# Patient Record
Sex: MALE | Race: BLACK OR AFRICAN AMERICAN | Employment: UNEMPLOYED | URBAN - METROPOLITAN AREA
[De-identification: names, ages, dates, MRNs, and addresses within clinical notes are randomized per-mention and may not be internally consistent; named-entity substitution may affect disease eponyms.]

---

## 2023-04-01 ENCOUNTER — APPOINTMENT (OUTPATIENT)
Dept: GENERAL RADIOLOGY | Age: 63
DRG: 176 | End: 2023-04-01
Attending: STUDENT IN AN ORGANIZED HEALTH CARE EDUCATION/TRAINING PROGRAM
Payer: MEDICARE

## 2023-04-01 ENCOUNTER — APPOINTMENT (OUTPATIENT)
Dept: ULTRASOUND IMAGING | Age: 63
DRG: 176 | End: 2023-04-01
Attending: STUDENT IN AN ORGANIZED HEALTH CARE EDUCATION/TRAINING PROGRAM
Payer: MEDICARE

## 2023-04-01 ENCOUNTER — APPOINTMENT (OUTPATIENT)
Dept: CT IMAGING | Age: 63
DRG: 176 | End: 2023-04-01
Attending: STUDENT IN AN ORGANIZED HEALTH CARE EDUCATION/TRAINING PROGRAM
Payer: MEDICARE

## 2023-04-01 ENCOUNTER — HOSPITAL ENCOUNTER (INPATIENT)
Age: 63
LOS: 2 days | Discharge: HOME | DRG: 176 | End: 2023-04-03
Attending: STUDENT IN AN ORGANIZED HEALTH CARE EDUCATION/TRAINING PROGRAM | Admitting: STUDENT IN AN ORGANIZED HEALTH CARE EDUCATION/TRAINING PROGRAM
Payer: MEDICARE

## 2023-04-01 DIAGNOSIS — I26.99 PULMONARY EMBOLISM, BILATERAL (HCC): Primary | ICD-10-CM

## 2023-04-01 DIAGNOSIS — I26.99 OTHER ACUTE PULMONARY EMBOLISM WITHOUT ACUTE COR PULMONALE (HCC): ICD-10-CM

## 2023-04-01 LAB
ALBUMIN SERPL-MCNC: 3.3 G/DL (ref 3.5–5)
ALBUMIN/GLOB SERPL: 0.9 (ref 1.1–2.2)
ALP SERPL-CCNC: 118 U/L (ref 45–117)
ALT SERPL-CCNC: 23 U/L (ref 12–78)
ANION GAP SERPL CALC-SCNC: 10 MMOL/L (ref 5–15)
APTT PPP: 25.5 SEC (ref 22.1–31)
AST SERPL-CCNC: 14 U/L (ref 15–37)
BASOPHILS # BLD: 0.1 K/UL (ref 0–0.1)
BASOPHILS NFR BLD: 1 % (ref 0–1)
BILIRUB SERPL-MCNC: 0.9 MG/DL (ref 0.2–1)
BNP SERPL-MCNC: 2959 PG/ML
BUN SERPL-MCNC: 14 MG/DL (ref 6–20)
BUN/CREAT SERPL: 13 (ref 12–20)
CALCIUM SERPL-MCNC: 9.2 MG/DL (ref 8.5–10.1)
CHLORIDE SERPL-SCNC: 102 MMOL/L (ref 97–108)
CO2 SERPL-SCNC: 25 MMOL/L (ref 21–32)
CREAT SERPL-MCNC: 1.07 MG/DL (ref 0.7–1.3)
D DIMER PPP FEU-MCNC: 13.29 MG/L FEU (ref 0–0.65)
DIFFERENTIAL METHOD BLD: ABNORMAL
EOSINOPHIL # BLD: 0.1 K/UL (ref 0–0.4)
EOSINOPHIL NFR BLD: 1 % (ref 0–7)
ERYTHROCYTE [DISTWIDTH] IN BLOOD BY AUTOMATED COUNT: 14.6 % (ref 11.5–14.5)
GLOBULIN SER CALC-MCNC: 3.6 G/DL (ref 2–4)
GLUCOSE SERPL-MCNC: 309 MG/DL (ref 65–100)
HCT VFR BLD AUTO: 41.6 % (ref 36.6–50.3)
HGB BLD-MCNC: 13.9 G/DL (ref 12.1–17)
IMM GRANULOCYTES # BLD AUTO: 0 K/UL (ref 0–0.04)
IMM GRANULOCYTES NFR BLD AUTO: 0 % (ref 0–0.5)
LYMPHOCYTES # BLD: 3.5 K/UL (ref 0.8–3.5)
LYMPHOCYTES NFR BLD: 34 % (ref 12–49)
MCH RBC QN AUTO: 29.3 PG (ref 26–34)
MCHC RBC AUTO-ENTMCNC: 33.4 G/DL (ref 30–36.5)
MCV RBC AUTO: 87.8 FL (ref 80–99)
MONOCYTES # BLD: 1.1 K/UL (ref 0–1)
MONOCYTES NFR BLD: 11 % (ref 5–13)
NEUTS SEG # BLD: 5.5 K/UL (ref 1.8–8)
NEUTS SEG NFR BLD: 53 % (ref 32–75)
NRBC # BLD: 0 K/UL (ref 0–0.01)
NRBC BLD-RTO: 0 PER 100 WBC
PLATELET # BLD AUTO: 204 K/UL (ref 150–400)
PMV BLD AUTO: 10.3 FL (ref 8.9–12.9)
POTASSIUM SERPL-SCNC: 3.5 MMOL/L (ref 3.5–5.1)
PROT SERPL-MCNC: 6.9 G/DL (ref 6.4–8.2)
RBC # BLD AUTO: 4.74 M/UL (ref 4.1–5.7)
SODIUM SERPL-SCNC: 137 MMOL/L (ref 136–145)
THERAPEUTIC RANGE,PTTT: NORMAL SECS (ref 58–77)
TROPONIN I SERPL HS-MCNC: 541 NG/L (ref 0–76)
TROPONIN I SERPL HS-MCNC: 775 NG/L (ref 0–76)
UFH PPP CHRO-ACNC: <0.1 IU/ML
WBC # BLD AUTO: 10.2 K/UL (ref 4.1–11.1)

## 2023-04-01 PROCEDURE — 83880 ASSAY OF NATRIURETIC PEPTIDE: CPT

## 2023-04-01 PROCEDURE — 85520 HEPARIN ASSAY: CPT

## 2023-04-01 PROCEDURE — 36415 COLL VENOUS BLD VENIPUNCTURE: CPT

## 2023-04-01 PROCEDURE — 85730 THROMBOPLASTIN TIME PARTIAL: CPT

## 2023-04-01 PROCEDURE — 96374 THER/PROPH/DIAG INJ IV PUSH: CPT

## 2023-04-01 PROCEDURE — 99285 EMERGENCY DEPT VISIT HI MDM: CPT

## 2023-04-01 PROCEDURE — 80053 COMPREHEN METABOLIC PANEL: CPT

## 2023-04-01 PROCEDURE — 74011250636 HC RX REV CODE- 250/636: Performed by: STUDENT IN AN ORGANIZED HEALTH CARE EDUCATION/TRAINING PROGRAM

## 2023-04-01 PROCEDURE — 94762 N-INVAS EAR/PLS OXIMTRY CONT: CPT

## 2023-04-01 PROCEDURE — 71275 CT ANGIOGRAPHY CHEST: CPT

## 2023-04-01 PROCEDURE — 65270000046 HC RM TELEMETRY

## 2023-04-01 PROCEDURE — 74011250637 HC RX REV CODE- 250/637: Performed by: STUDENT IN AN ORGANIZED HEALTH CARE EDUCATION/TRAINING PROGRAM

## 2023-04-01 PROCEDURE — 84484 ASSAY OF TROPONIN QUANT: CPT

## 2023-04-01 PROCEDURE — 85025 COMPLETE CBC W/AUTO DIFF WBC: CPT

## 2023-04-01 PROCEDURE — 85379 FIBRIN DEGRADATION QUANT: CPT

## 2023-04-01 PROCEDURE — 74011000636 HC RX REV CODE- 636: Performed by: STUDENT IN AN ORGANIZED HEALTH CARE EDUCATION/TRAINING PROGRAM

## 2023-04-01 PROCEDURE — 93005 ELECTROCARDIOGRAM TRACING: CPT

## 2023-04-01 RX ORDER — ONDANSETRON 4 MG/1
4 TABLET, ORALLY DISINTEGRATING ORAL
Status: DISCONTINUED | OUTPATIENT
Start: 2023-04-01 | End: 2023-04-03 | Stop reason: HOSPADM

## 2023-04-01 RX ORDER — HEPARIN SODIUM 1000 [USP'U]/ML
10000 INJECTION, SOLUTION INTRAVENOUS; SUBCUTANEOUS ONCE
Status: COMPLETED | OUTPATIENT
Start: 2023-04-01 | End: 2023-04-01

## 2023-04-01 RX ORDER — SODIUM CHLORIDE 0.9 % (FLUSH) 0.9 %
5-40 SYRINGE (ML) INJECTION EVERY 8 HOURS
Status: DISCONTINUED | OUTPATIENT
Start: 2023-04-01 | End: 2023-04-03 | Stop reason: HOSPADM

## 2023-04-01 RX ORDER — CHOLECALCIFEROL (VITAMIN D3) 125 MCG
CAPSULE ORAL
COMMUNITY

## 2023-04-01 RX ORDER — INSULIN LISPRO 100 [IU]/ML
INJECTION, SOLUTION INTRAVENOUS; SUBCUTANEOUS
Status: DISCONTINUED | OUTPATIENT
Start: 2023-04-01 | End: 2023-04-03 | Stop reason: HOSPADM

## 2023-04-01 RX ORDER — ACETAMINOPHEN 650 MG/1
650 SUPPOSITORY RECTAL
Status: DISCONTINUED | OUTPATIENT
Start: 2023-04-01 | End: 2023-04-03 | Stop reason: HOSPADM

## 2023-04-01 RX ORDER — ACETAMINOPHEN 325 MG/1
650 TABLET ORAL
Status: DISCONTINUED | OUTPATIENT
Start: 2023-04-01 | End: 2023-04-03 | Stop reason: HOSPADM

## 2023-04-01 RX ORDER — ONDANSETRON 2 MG/ML
4 INJECTION INTRAMUSCULAR; INTRAVENOUS
Status: DISCONTINUED | OUTPATIENT
Start: 2023-04-01 | End: 2023-04-03 | Stop reason: HOSPADM

## 2023-04-01 RX ORDER — ASPIRIN 325 MG
325 TABLET ORAL
Status: COMPLETED | OUTPATIENT
Start: 2023-04-01 | End: 2023-04-01

## 2023-04-01 RX ORDER — DEXTROSE MONOHYDRATE 100 MG/ML
0-250 INJECTION, SOLUTION INTRAVENOUS AS NEEDED
Status: DISCONTINUED | OUTPATIENT
Start: 2023-04-01 | End: 2023-04-03 | Stop reason: HOSPADM

## 2023-04-01 RX ORDER — HEPARIN SODIUM 10000 [USP'U]/100ML
13-36 INJECTION, SOLUTION INTRAVENOUS
Status: DISCONTINUED | OUTPATIENT
Start: 2023-04-01 | End: 2023-04-03 | Stop reason: HOSPADM

## 2023-04-01 RX ORDER — HEPARIN SODIUM 1000 [USP'U]/ML
5000 INJECTION, SOLUTION INTRAVENOUS; SUBCUTANEOUS AS NEEDED
Status: DISCONTINUED | OUTPATIENT
Start: 2023-04-01 | End: 2023-04-03 | Stop reason: HOSPADM

## 2023-04-01 RX ORDER — IBUPROFEN 200 MG
4 TABLET ORAL AS NEEDED
Status: DISCONTINUED | OUTPATIENT
Start: 2023-04-01 | End: 2023-04-03 | Stop reason: HOSPADM

## 2023-04-01 RX ORDER — HEPARIN SODIUM 1000 [USP'U]/ML
10000 INJECTION, SOLUTION INTRAVENOUS; SUBCUTANEOUS AS NEEDED
Status: DISCONTINUED | OUTPATIENT
Start: 2023-04-01 | End: 2023-04-03 | Stop reason: HOSPADM

## 2023-04-01 RX ORDER — POLYETHYLENE GLYCOL 3350 17 G/17G
17 POWDER, FOR SOLUTION ORAL DAILY PRN
Status: DISCONTINUED | OUTPATIENT
Start: 2023-04-01 | End: 2023-04-03 | Stop reason: HOSPADM

## 2023-04-01 RX ORDER — SODIUM CHLORIDE 0.9 % (FLUSH) 0.9 %
5-40 SYRINGE (ML) INJECTION AS NEEDED
Status: DISCONTINUED | OUTPATIENT
Start: 2023-04-01 | End: 2023-04-03 | Stop reason: HOSPADM

## 2023-04-01 RX ORDER — METFORMIN HYDROCHLORIDE 500 MG/1
1000 TABLET ORAL 2 TIMES DAILY WITH MEALS
COMMUNITY

## 2023-04-01 RX ORDER — DM/P-EPHED/ACETAMINOPH/DOXYLAM 30-7.5/3
2 LIQUID (ML) ORAL
Status: DISCONTINUED | OUTPATIENT
Start: 2023-04-01 | End: 2023-04-03 | Stop reason: HOSPADM

## 2023-04-01 RX ADMIN — HEPARIN SODIUM 10000 UNITS: 1000 INJECTION INTRAVENOUS; SUBCUTANEOUS at 20:47

## 2023-04-01 RX ADMIN — IOMEPROL INJECTION 100 ML: 714 INJECTION, SOLUTION INTRAVASCULAR at 19:58

## 2023-04-01 RX ADMIN — HEPARIN SODIUM AND DEXTROSE 13 UNITS/KG/HR: 10000; 5 INJECTION INTRAVENOUS at 20:48

## 2023-04-01 RX ADMIN — SODIUM CHLORIDE 1000 ML: 9 INJECTION, SOLUTION INTRAVENOUS at 20:45

## 2023-04-01 RX ADMIN — ASPIRIN 325 MG: 325 TABLET ORAL at 20:38

## 2023-04-02 ENCOUNTER — APPOINTMENT (OUTPATIENT)
Dept: ULTRASOUND IMAGING | Age: 63
DRG: 176 | End: 2023-04-02
Attending: STUDENT IN AN ORGANIZED HEALTH CARE EDUCATION/TRAINING PROGRAM
Payer: MEDICARE

## 2023-04-02 LAB
ANION GAP SERPL CALC-SCNC: 6 MMOL/L (ref 5–15)
BASOPHILS # BLD: 0 K/UL (ref 0–0.1)
BASOPHILS NFR BLD: 0 % (ref 0–1)
BUN SERPL-MCNC: 13 MG/DL (ref 6–20)
BUN/CREAT SERPL: 15 (ref 12–20)
CALCIUM SERPL-MCNC: 9 MG/DL (ref 8.5–10.1)
CHLORIDE SERPL-SCNC: 104 MMOL/L (ref 97–108)
CO2 SERPL-SCNC: 26 MMOL/L (ref 21–32)
CREAT SERPL-MCNC: 0.88 MG/DL (ref 0.7–1.3)
DIFFERENTIAL METHOD BLD: ABNORMAL
EOSINOPHIL # BLD: 0.1 K/UL (ref 0–0.4)
EOSINOPHIL NFR BLD: 1 % (ref 0–7)
ERYTHROCYTE [DISTWIDTH] IN BLOOD BY AUTOMATED COUNT: 14.7 % (ref 11.5–14.5)
GLUCOSE BLD STRIP.AUTO-MCNC: 170 MG/DL (ref 65–117)
GLUCOSE BLD STRIP.AUTO-MCNC: 200 MG/DL (ref 65–117)
GLUCOSE BLD STRIP.AUTO-MCNC: 214 MG/DL (ref 65–117)
GLUCOSE BLD STRIP.AUTO-MCNC: 281 MG/DL (ref 65–117)
GLUCOSE SERPL-MCNC: 279 MG/DL (ref 65–100)
HCT VFR BLD AUTO: 39 % (ref 36.6–50.3)
HGB BLD-MCNC: 13.1 G/DL (ref 12.1–17)
IMM GRANULOCYTES # BLD AUTO: 0 K/UL (ref 0–0.04)
IMM GRANULOCYTES NFR BLD AUTO: 0 % (ref 0–0.5)
LYMPHOCYTES # BLD: 2.5 K/UL (ref 0.8–3.5)
LYMPHOCYTES NFR BLD: 25 % (ref 12–49)
MCH RBC QN AUTO: 29.4 PG (ref 26–34)
MCHC RBC AUTO-ENTMCNC: 33.6 G/DL (ref 30–36.5)
MCV RBC AUTO: 87.6 FL (ref 80–99)
MONOCYTES # BLD: 1 K/UL (ref 0–1)
MONOCYTES NFR BLD: 10 % (ref 5–13)
NEUTS SEG # BLD: 6.3 K/UL (ref 1.8–8)
NEUTS SEG NFR BLD: 64 % (ref 32–75)
NRBC # BLD: 0 K/UL (ref 0–0.01)
NRBC BLD-RTO: 0 PER 100 WBC
PLATELET # BLD AUTO: 166 K/UL (ref 150–400)
PMV BLD AUTO: 9.9 FL (ref 8.9–12.9)
POTASSIUM SERPL-SCNC: 3.4 MMOL/L (ref 3.5–5.1)
RBC # BLD AUTO: 4.45 M/UL (ref 4.1–5.7)
SERVICE CMNT-IMP: ABNORMAL
SODIUM SERPL-SCNC: 136 MMOL/L (ref 136–145)
TROPONIN I SERPL HS-MCNC: 600 NG/L (ref 0–76)
UFH PPP CHRO-ACNC: 0.32 IU/ML
UFH PPP CHRO-ACNC: 0.46 IU/ML
WBC # BLD AUTO: 9.8 K/UL (ref 4.1–11.1)

## 2023-04-02 PROCEDURE — 93970 EXTREMITY STUDY: CPT

## 2023-04-02 PROCEDURE — 85520 HEPARIN ASSAY: CPT

## 2023-04-02 PROCEDURE — 82962 GLUCOSE BLOOD TEST: CPT

## 2023-04-02 PROCEDURE — 65270000046 HC RM TELEMETRY

## 2023-04-02 PROCEDURE — 74011250636 HC RX REV CODE- 250/636: Performed by: NURSE PRACTITIONER

## 2023-04-02 PROCEDURE — 80048 BASIC METABOLIC PNL TOTAL CA: CPT

## 2023-04-02 PROCEDURE — 74011250636 HC RX REV CODE- 250/636: Performed by: STUDENT IN AN ORGANIZED HEALTH CARE EDUCATION/TRAINING PROGRAM

## 2023-04-02 PROCEDURE — 85025 COMPLETE CBC W/AUTO DIFF WBC: CPT

## 2023-04-02 PROCEDURE — 74011000250 HC RX REV CODE- 250: Performed by: STUDENT IN AN ORGANIZED HEALTH CARE EDUCATION/TRAINING PROGRAM

## 2023-04-02 PROCEDURE — 74011636637 HC RX REV CODE- 636/637: Performed by: STUDENT IN AN ORGANIZED HEALTH CARE EDUCATION/TRAINING PROGRAM

## 2023-04-02 PROCEDURE — 36415 COLL VENOUS BLD VENIPUNCTURE: CPT

## 2023-04-02 PROCEDURE — 84484 ASSAY OF TROPONIN QUANT: CPT

## 2023-04-02 RX ORDER — POTASSIUM CHLORIDE 7.45 MG/ML
10 INJECTION INTRAVENOUS
Status: COMPLETED | OUTPATIENT
Start: 2023-04-02 | End: 2023-04-02

## 2023-04-02 RX ADMIN — POTASSIUM CHLORIDE 10 MEQ: 7.46 INJECTION, SOLUTION INTRAVENOUS at 14:04

## 2023-04-02 RX ADMIN — SODIUM CHLORIDE, PRESERVATIVE FREE 10 ML: 5 INJECTION INTRAVENOUS at 06:34

## 2023-04-02 RX ADMIN — Medication 2 UNITS: at 21:26

## 2023-04-02 RX ADMIN — SODIUM CHLORIDE, PRESERVATIVE FREE 10 ML: 5 INJECTION INTRAVENOUS at 14:05

## 2023-04-02 RX ADMIN — POTASSIUM CHLORIDE 10 MEQ: 7.46 INJECTION, SOLUTION INTRAVENOUS at 17:03

## 2023-04-02 RX ADMIN — Medication 2 UNITS: at 17:03

## 2023-04-02 RX ADMIN — HEPARIN SODIUM AND DEXTROSE 13 UNITS/KG/HR: 10000; 5 INJECTION INTRAVENOUS at 07:23

## 2023-04-02 RX ADMIN — HEPARIN SODIUM AND DEXTROSE 13 UNITS/KG/HR: 10000; 5 INJECTION INTRAVENOUS at 19:10

## 2023-04-02 RX ADMIN — POTASSIUM CHLORIDE 10 MEQ: 7.46 INJECTION, SOLUTION INTRAVENOUS at 12:31

## 2023-04-02 RX ADMIN — Medication 3 UNITS: at 12:31

## 2023-04-02 RX ADMIN — Medication 5 UNITS: at 08:52

## 2023-04-02 RX ADMIN — SODIUM CHLORIDE, PRESERVATIVE FREE 10 ML: 5 INJECTION INTRAVENOUS at 21:20

## 2023-04-02 RX ADMIN — POTASSIUM CHLORIDE 10 MEQ: 7.46 INJECTION, SOLUTION INTRAVENOUS at 15:22

## 2023-04-02 NOTE — PROGRESS NOTES
Problem: Falls - Risk of  Goal: *Absence of Falls  Description: Document Nico Witten Fall Risk and appropriate interventions in the flowsheet.   Outcome: Progressing Towards Goal  Note: Fall Risk Interventions:

## 2023-04-02 NOTE — ED NOTES
US tech called reported pt refusing to take off pants and have test done a this time, that pt said he was not feeling it right now maybe tomorrow. Dr Imelda Martinez updated.

## 2023-04-02 NOTE — PROGRESS NOTES
Problem: Falls - Risk of  Goal: *Absence of Falls  Description: Document [facility] Flight Fall Risk and appropriate interventions in the flowsheet.   Outcome: Progressing Towards Goal  Note: Fall Risk Interventions:                                Problem: Patient Education: Go to Patient Education Activity  Goal: Patient/Family Education  Outcome: Progressing Towards Goal

## 2023-04-02 NOTE — PROGRESS NOTES
Transition of Care Plan:    RUR:6%   Disposition:home    If SNF or IPR: Date FOC offered:  Date FOC received:  Date authorization started with reference number:  Date authorization received and expires:  Accepting facility:    Follow up appointments: to be scheduled   DME needed:none ambulates with a walker   Transportation at [address] or means to access home:  yes       Medicare Letter:n/a has [place] Medicaid   Is patient a  and connected with the [place]?  no              Caregiver Contact:relative Eva Ragsdale [phone]  Discharge Caregiver contacted prior to discharge? no  Care Conference needed?:  no                 Reason for Admission:  pulmonary embolism                      RUR Score:   6%                  Plan for utilizing home health:    not recommended       PCP: First and Last name:  Mary Canas MD     Name of Practice:    Are you a current patient: Yes/No:    Approximate date of last visit:    Can you participate in a virtual visit with your PCP:                     Current Advanced Directive/Advance Care Plan: Full Code      Healthcare Decision Maker:   Click here to complete [address] including selection of the Healthcare Decision Maker Relationship (ie \"Primary\")                             Transition of Care Plan:                      Patient lives in an apartment alone. Patient is independent in his ADLs/IADLs. Patient ambulates with a walker. Patient uses [facility] [address] on [address] for his prescriptions. No history of HH, SNF, IPR. Care Management Interventions  PCP Verified by CM:  Yes  Palliative Care Criteria Met (RRAT>21 & CHF Dx)?: No  Transition of Care Consult (CM Consult): Discharge Planning  MyChart Signup: No  Discharge Durable Medical Equipment: No  Health Maintenance Reviewed: Yes  Physical Therapy Consult: No  Occupational Therapy Consult: No  Speech Therapy Consult: No  Support Systems: Other Family Member(s)  Confirm Follow Up Transport: [employer] Provided?: No  Discharge Location  Patient Expects to be Discharged to[de-identified] Home with family assistance    10:42 AM  GILLIAN Ivey

## 2023-04-02 NOTE — H&P
This note is compiled to communicate with the medical care team. It may contain sensitive and protected information. It is not intended to serve as a source of communication with patients/families/friends; that communication occurs at the bedside or via alternative direct communications means (secure messaging, letters, video/telephone, etc.). Hospitalist Admission Note    NAME: Abel Beebe   :  1960   MRN:  [id number]     Date/Time:  2023 8:57 PM    Patient PCP: Yonathan Vega MD  ______________________________________________________________________  Given the patient's current clinical presentation, I have a high level of concern for decompensation if discharged from the emergency department. Complex decision making was performed, which includes reviewing the patient's available past medical records, laboratory results, and x-ray films. My assessment of this patient's clinical condition and my plan of care is as follows. Subjective:   CHIEF COMPLAINT: Syncope    ED TRIAGE SUMMARY:  Chief Complaint   Patient presents with    Syncope     Via EMS from home. Pt had a syncopal episode this morning and another one this evening. This evening family called EMS. Pt had gone to the bathroom and then passed out after getting back to his chair. EMS reports pt was diaphoretic and sob. After ambulating to squad, he got short of breath and had a near syncopal episode at that time. Initial sbp was 190; last sbp 114. HISTORY OF PRESENT ILLNESS:     Abel Beebe is a 59 y.o.  male with pertinent past medical history of non-insulin-dependent diabetes mellitus, vitamin D deficiency, obesity class III by BMI 57.14 who presents with complaints of shortness of breath and multiple episodes of syncope since yesterday. He denies any other associated symptoms including chest pain, cough, fever/chills, nausea/vomiting, headache.   Patient does report he has noticed increased swelling in his right lower extremity over the past few days but denies trauma/infection/prolonged immobility and reports no history of blood clots. ROS otherwise negative. Patient reports tobacco use (0.25-0.5 pack/day). He denies alcohol or illicit drug use. In the ED, patient afebrile, tachycardic 100s-110s, tachypneic in mid 20s, hypertensive 140s/80s, saturating mid 90s on room air. CTA chest demonstrates bilateral moderate to severe burden of pulmonary emboli of distal right and left pulmonary arteries to the segmental branches with evidence of right heart strain and reflux of contrast into hepatic veins with enlarged main pulmonary trunk suggestive of pulmonary artery hypertension and wedge-shaped opacity in left upper lobe likely representing pulmonary infarct. Labs demonstrate: High sensitive troponin 541-> 775, proBNP 2959, CBC unremarkable, D-dimer 13.29, sodium 137, potassium 3.5, glucose 309, BUN 14, creatinine 1.07. Patient started on heparin drip and given full-strength aspirin by ED physician. We were asked to admit for work up and evaluation of the above problems. No past medical history on file. No past surgical history on file. Social History     Tobacco Use    Smoking status: Not on file    Smokeless tobacco: Not on file   Substance Use Topics    Alcohol use: Not on file        No family history on file. No Known Allergies     Prior to Admission medications    Medication Sig Start Date End Date Taking? Authorizing Provider   metFORMIN (GLUCOPHAGE) 500 mg tablet Take 1,000 mg by mouth two (2) times daily (with meals). 1000mg QAM, 500mg QPM   Yes Provider, Historical   cholecalciferol, vitamin D3, (Vitamin D3) 50 mcg (2,000 unit) tab Take  by mouth. Yes Provider, Historical       REVIEW OF SYSTEMS:       Comprehensive review of systems obtained with pertinent positives and negatives as documented in HPI.       Objective:   VITALS:    Visit Vitals  BP (!) 148/86   Pulse (!) 101   Temp 97.9 °F (36.6 °C)   Resp 30   Ht 5' 6\" (1.676 m)   Wt (!) 160.6 kg (354 lb)   SpO2 96%   BMI 57.14 kg/m²       PHYSICAL EXAM:    General:   Alert, cooperative, middle-aged -American male in no apparent distress        HEENT:  Atraumatic, anicteric sclerae, pink conjunctivae, mucosa moist, dentition fair     Neck:  Supple, symmetrical, trachea midline     Lungs:   CTAB. Symmetric expansion. Good aeration. Normal respiratory effort. Chest wall:  No tenderness. No Accessory muscle use. Cardiovascular:   Tachycardic rate, regular rhythm, No murmur/rub/gallop. No JVD. Radial/AT/DP pulse 2+     GI/:   Obese/protuberant, soft, non-tender. Not distended. Bowel sounds normal. No HSM     Extremities Mild/moderate pitting edema BLE right greater than left without erythema. Negative Homans signs bilaterally. Skin: No significant lesions noted. Not Jaundiced. No rashes      Neurologic: PERRL. EOMI. No facial asymmetry. No aphasia or slurred speech. Moves all extremities. No overt focal deficits appreciated     Psych:  Alert and oriented X 4. Normal affect. Good insight     Other:   N/A         LAB DATA REVIEWED:    Recent Results (from the past 24 hour(s))   CBC WITH AUTOMATED DIFF    Collection Time: 04/01/23  6:05 PM   Result Value Ref Range    WBC 10.2 4.1 - 11.1 K/uL    RBC 4.74 4.10 - 5.70 M/uL    HGB 13.9 12.1 - 17.0 g/dL    HCT 41.6 36.6 - 50.3 %    MCV 87.8 80.0 - 99.0 FL    MCH 29.3 26.0 - 34.0 PG    MCHC 33.4 30.0 - 36.5 g/dL    RDW 14.6 (H) 11.5 - 14.5 %    PLATELET 617 831 - 384 K/uL    MPV 10.3 8.9 - 12.9 FL    NRBC 0.0 0  WBC    ABSOLUTE NRBC 0.00 0.00 - 0.01 K/uL    NEUTROPHILS 53 32 - 75 %    LYMPHOCYTES 34 12 - 49 %    MONOCYTES 11 5 - 13 %    EOSINOPHILS 1 0 - 7 %    BASOPHILS 1 0 - 1 %    IMMATURE GRANULOCYTES 0 0.0 - 0.5 %    ABS. NEUTROPHILS 5.5 1.8 - 8.0 K/UL    ABS. LYMPHOCYTES 3.5 0.8 - 3.5 K/UL    ABS. MONOCYTES 1.1 (H) 0.0 - 1.0 K/UL    ABS.  EOSINOPHILS 0.1 0.0 - 0.4 K/UL    ABS. BASOPHILS 0.1 0.0 - 0.1 K/UL    ABS. IMM. GRANS. 0.0 0.00 - 0.04 K/UL    DF AUTOMATED     METABOLIC PANEL, COMPREHENSIVE    Collection Time: 04/01/23  6:05 PM   Result Value Ref Range    Sodium 137 136 - 145 mmol/L    Potassium 3.5 3.5 - 5.1 mmol/L    Chloride 102 97 - 108 mmol/L    CO2 25 21 - 32 mmol/L    Anion gap 10 5 - 15 mmol/L    Glucose 309 (H) 65 - 100 mg/dL    BUN 14 6 - 20 MG/DL    Creatinine 1.07 0.70 - 1.30 MG/DL    BUN/Creatinine ratio 13 12 - 20      eGFR >60 >60 ml/min/1.73m2    Calcium 9.2 8.5 - 10.1 MG/DL    Bilirubin, total 0.9 0.2 - 1.0 MG/DL    ALT (SGPT) 23 12 - 78 U/L    AST (SGOT) 14 (L) 15 - 37 U/L    Alk. phosphatase 118 (H) 45 - 117 U/L    Protein, total 6.9 6.4 - 8.2 g/dL    Albumin 3.3 (L) 3.5 - 5.0 g/dL    Globulin 3.6 2.0 - 4.0 g/dL    A-G Ratio 0.9 (L) 1.1 - 2.2     TROPONIN-HIGH SENSITIVITY    Collection Time: 04/01/23  6:05 PM   Result Value Ref Range    Troponin-High Sensitivity 541 (HH) 0 - 76 ng/L   NT-PRO BNP    Collection Time: 04/01/23  6:05 PM   Result Value Ref Range    NT pro-BNP 2,959 (H) <125 PG/ML   D DIMER    Collection Time: 04/01/23  6:05 PM   Result Value Ref Range    D-dimer 13.29 (H) 0.00 - 0.65 mg/L FEU       IMAGING:  CXR Results  (Last 48 hours)      None            CT Results  (Last 48 hours)                 04/01/23 1933  CTA CHEST W OR W WO CONT Final result    Impression:  1. Bilateral moderate to severe burden of pulmonary emboli in the distal right   and left pulmonary arteries to segmental branches. 2.  Right heart strain with RV to LV ratio of approximately 2. Reflux of   contrast into the hepatic veins. 3.  Enlarged main pulmonary trunk suggestive of pulmonary arterial hypertension. 4.  Wedge-shaped opacity in the left upper lobe likely represents pulmonary   infarct.            Narrative:  EXAM: CTA CHEST W OR W WO CONT       DATE: 4/1/2023 7:33 PM       INDICATION: eval PE       COMPARISON: None       TECHNIQUE: Helical thin section chest CT following uneventful intravenous   administration of nonionic contrast 100 mL of isovue 370 according to   departmental PE protocol. Coronal and sagittal reformats were performed. 3D post   processing was performed. CT dose reduction was achieved through the use of a   standardized protocol tailored for this examination and automatic exposure   control for dose modulation. FINDINGS: This is a good quality study for the evaluation of pulmonary embolism   to the first subsegmental arterial level. Bilateral pulmonary emboli identified. Centrally, emboli noted in the distal right and left main pulmonary arteries. On   the right, partially or completely occlusive emboli are noted in the upper,   middle, and lower lobar arteries and segmental branches. On the left, partially   or completely occlusive emboli are seen in the upper, lingular, and lower lobar   arteries and segmental branches. Prominent main pulmonary trunk measures 36 mm. CHEST WALL: No axillary or supraclavicular lymphadenopathy. THYROID: No nodule. MEDIASTINUM: No mass or lymphadenopathy. BALTAZAR: No mass or lymphadenopathy. THORACIC AORTA: No aneurysm. HEART: RV to LV ratio of approximately 2-1. Mildly prominent heart size. ESOPHAGUS: No wall thickening or dilatation. TRACHEA/BRONCHI: Patent. PLEURA: No effusion or pneumothorax. LUNGS: Peripheral wedge-shaped opacity in the anterior left upper lobe, 303-106. No masses or suspicious pulmonary nodules. UPPER ABDOMEN: Partially imaged. No acute pathology. BONES: No aggressive bone lesion or fracture.                ______________________________________________________________________    Assessment / Plan:  Bilateral pulmonary emboli  Concern for RLE DVT  Evidence of right heart strain  Uptrending troponin-suspect demand ischemia  Non-insulin-dependent diabetes mellitus  Tobacco use    PLAN:    Bilateral pulmonary emboli  Concern for RLE DVT  Evidence of right heart strain  Uptrending troponin-suspect demand ischemia  Admit to stepdown unit  Telemetry monitoring  Continuous pulse oximetry and supplemental oxygen as needed maintain saturations greater than 90%  Continue heparin drip  We will consult interventional radiology for consideration of catheter directed thrombolytics given uptrending troponin and evidence of right heart strain  Obtain formal echo  Bilateral lower extremity DVT duplex to further risk stratify based on remaining clot burden that could potentially embolize  Trend troponin to peak/plateau  Full-strength aspirin given in ED, if troponin continues to uptrend overnight would continue with 81 mg aspirin daily and consult cardiology to ensure no superimposed ACS process although I suspect this is all secondary to demand ischemia from right heart strain  At present patient hemodynamically stable without reported chest pain or significant shortness of breath while at rest and no ectopy on monitor. I believe he is stable for stepdown unit with close monitoring. However, given extensive clot burden and uptrending troponin would have low threshold to consult ICU. We will reach out to interventional radiologist for consideration of catheter directed thrombolytics. -Did review with patient potential need for thrombolytic should he deteriorate. Patient reports no history of bleeding, bleeding disorders, recent trauma, or procedures/surgeries. He verbalized understanding of risks and stated that should this be deemed necessary he would be agreeable to thrombolytic administration. Non-insulin-dependent diabetes mellitus  Sliding scale corrective coverage insulin  Accu-Cheks  Escalate as needed-suspect patient may require basal insulin although he is on minimal p.o. antihyperglycemics in the outpatient setting    Tobacco use  3 minutes tobacco cessation counseling provided with emphasis on adverse cardiopulmonary effects of ongoing tobacco use. After discussion patient agreeable to cessation attempt electing for nicotine lozenges. Case discussed directly with ED provider. After discussion I am in agreement that acuity of patient's medical condition necessitates hospital stay. Code Status: Full  DVT Prophylaxis: Heparin  GI Prophylaxis: Not indicated  Diet: Diabetic/cardiac       Care Plan discussed with:    Comments   Patient x    Family      RN     Care Manager                    Consultant:      _______________________________________________________________________    Expected  Disposition:   Home with Family x   HH/PT/OT/RN    SNF/LTC    [facility]    ________________________________________________________________________    MEDICAL COMPLEXITY: high  TOBACCO CESSATION COUNSELING: YES        Comments    x Reviewed previous records   >50% of visit spent in counseling and coordination of care x Discussion with patient and/or family and questions answered       ________________________________________________________________________  Signed: Whitney Peoples DO  4/1/2023 8:57 PM    Please note that this documentation was completed in part with Dragon dictation software. Occasionally unanticipated grammatical, syntax, homophones, and other interpretive errors are inadvertently transcribed by the computer software. Please excuse and disregard any errors that have escaped final proofreading. If in doubt, please do not hesitate to reach out to myself or the assigned hospitalist via [facility] system for clarification.

## 2023-04-02 NOTE — ED NOTES
Assumed care of pt. Pt reports short of air for the last 3 days and started having syncopal episodes yesterday, denies any pain, does report leg swelling worse on the right-pt refusing to take off pants at this time. Pt is changed into gown from waist up, pt denies any wounds to lower half-unable to assess at this time. Pt AOX4, PWD, VSS.

## 2023-04-02 NOTE — PROGRESS NOTES
Hospitalist Progress Note    NAME: Travis Quiñonez   :  1960   MRN:  [id number]       Assessment / Plan:  Bilateral pulmonary emboli  Concern for RLE DVT  Evidence of right heart strain  Uptrending troponin-suspect demand ischemia  C/w stepdown unit  Telemetry monitoring  Continuous pulse oximetry and supplemental oxygen as needed maintain saturations greater than 90%  Continue heparin drip  Consulted interventional radiology for consideration of catheter directed thrombolytics given evidence of right heart strain, patient currently hemodynamically stable  Bilateral lower extremity DVT duplex to further risk stratify based on remaining clot burden that could potentially embolize  Troponin peaked  Consult Cardiology  Consult Pulmonology  ECHO pending  LE dopplers pending  Patient with current stable vital signs    Will also ask heme/onc for assistance given extensive clot burden      Non-insulin-dependent diabetes mellitus  Sliding scale corrective coverage insulin  Accu-Cheks  Check A1C     Tobacco use  Counseled on cessation     Case discussed directly with ED provider. After discussion I am in agreement that acuity of patient's medical condition necessitates hospital stay. 36 or above Morbid obesity / Body mass index is 57.14 kg/m². Estimated discharge date:   Barriers: PE, ECHO, LE dopplers    Code status: Full  Prophylaxis: Heparin gtt  Recommended Disposition: Home w/Family     Subjective:     Chief Complaint / Reason for Physician Visit  . Discussed with RN events overnight. Review of Systems:  Symptom Y/N Comments  Symptom Y/N Comments   Fever/Chills    Chest Pain     Poor Appetite    Edema     Cough    Abdominal Pain     Sputum    Joint Pain     SOB/FERNANDEZ    Pruritis/Rash     Nausea/vomit    Tolerating PT/OT     Diarrhea    Tolerating Diet     Constipation    Other       Could NOT obtain due to:      Objective:     VITALS:   Last 24hrs VS reviewed since prior progress note.  Most recent are:  Patient Vitals for the past 24 hrs:   Temp Pulse Resp BP SpO2   04/02/23 0800 -- 82 -- -- --   04/02/23 0729 98.3 °F (36.8 °C) 83 22 (!) 139/90 95 %   04/02/23 0418 98.1 °F (36.7 °C) 86 22 124/81 100 %   04/02/23 0114 98.1 °F (36.7 °C) 91 22 135/87 97 %   04/02/23 0019 -- 93 29 (!) 118/100 --   04/02/23 0004 -- 92 28 126/74 --   04/01/23 2349 -- 94 (!) 32 118/74 --   04/01/23 2300 98.4 °F (36.9 °C) 94 25 (!) 137/100 97 %   04/01/23 2219 -- 93 30 117/61 96 %   04/01/23 2204 -- 100 24 (!) 142/84 96 %   04/01/23 2149 -- 96 21 129/87 97 %   04/01/23 2134 -- 99 27 (!) 121/105 94 %   04/01/23 2119 -- 99 19 103/71 93 %   04/01/23 2104 -- (!) 101 25 101/85 97 %   04/01/23 2049 -- (!) 101 24 (!) 131/52 96 %   04/01/23 2034 -- (!) 102 24 (!) 140/78 97 %   04/01/23 2019 -- (!) 101 30 (!) 148/86 96 %   04/01/23 2004 -- (!) 101 -- (!) 153/76 95 %   04/01/23 1749 97.9 °F (36.6 °C) (!) 117 16 122/79 94 %     No intake or output data in the 24 hours ending 04/02/23 0948     I had a face to face encounter and independently examined this patient on 4/2/2023, as outlined below:  PHYSICAL EXAM:  General: WD, WN. Alert, cooperative, no acute distress    EENT:  EOMI. Anicteric sclerae. MMM  Resp:  CTA bilaterally, no wheezing or rales. No accessory muscle use  CV:  Regular  rhythm,  No edema  GI:  Soft, Non distended, Non tender. +Bowel sounds  Neurologic:  Alert and oriented X 3, normal speech,   Psych:   Good insight. Not anxious nor agitated  Skin:  No rashes.   No jaundice    Reviewed most current lab test results and cultures  YES  Reviewed most current radiology test results   YES  Review and summation of old records today    NO  Reviewed patient's current orders and MAR    YES  PMH/SH reviewed - no change compared to H&P  ________________________________________________________________________  Care Plan discussed with:    Comments   Patient     Family      RN     Care Manager     Consultant Multidiciplinary team rounds were held today with , nursing, pharmacist and clinical coordinator. Patient's plan of care was discussed; medications were reviewed and discharge planning was addressed. ________________________________________________________________________  Total NON critical care TIME:  45   Minutes    Total CRITICAL CARE TIME Spent:   Minutes non procedure based      Comments   >50% of visit spent in counseling and coordination of care     ________________________________________________________________________  Deondre Peterson MD     Procedures: see electronic medical records for all procedures/Xrays and details which were not copied into this note but were reviewed prior to creation of Plan. LABS:  I reviewed today's most current labs and imaging studies.   Pertinent labs include:  Recent Labs     04/02/23  0237 04/01/23  1805   WBC 9.8 10.2   HGB 13.1 13.9   HCT 39.0 41.6    204     Recent Labs     04/02/23  0237 04/01/23  1805    137   K 3.4* 3.5    102   CO2 26 25   * 309*   BUN 13 14   CREA 0.88 1.07   CA 9.0 9.2   ALB  --  3.3*   TBILI  --  0.9   ALT  --  23       Signed: Deondre Peterson MD

## 2023-04-02 NOTE — PROGRESS NOTES
0279: Bedside shift change report given to Izzy Nobles RN (oncoming nurse) by Robert Cui RN (offgoing nurse). Report included the following information SBAR and Kardex. 5664: Cardiology consult called [phone], Edmundo Broussard MD on call. 8243: Pulmonology consult called [phone], Codi Caputo MD on call    1900: End of Shift Note    Bedside shift change report given to Robert Cui RN (oncoming nurse) by Lakshmi Roy RN (offgoing nurse). Report included the following information SBAR and Kardex    Shift worked:  7a-7p     Shift summary and any significant changes:     Cardiology and pulmonology consulted. Please see chart/notes for additional information. Concerns for physician to address:  None at this time     Zone phone for oncoming shift:          Activity:  Activity Level:  Up with Assistance  Number times ambulated in hallways past shift: 0  Number of times OOB to chair past shift: 0    Cardiac:   Cardiac Monitoring: Yes      Cardiac Rhythm: Sinus Rhythm    Access:  Current line(s): PIV     Genitourinary:   Urinary status: voiding    Respiratory:   O2 Device: None (Room air)  Chronic home O2 use?: NO  Incentive spirometer at bedside: NO       GI:  Last Bowel Movement Date: 04/01/23  Current diet:  ADULT DIET Regular; 4 carb choices (60 gm/meal)  Passing flatus: YES  Tolerating current diet: YES       Pain Management:   Patient states pain is manageable on current regimen: YES    Skin:  Lucian Score: 20  Interventions: increase time out of bed and PT/OT consult    Patient Safety:  Fall Score:    Interventions: bed/chair alarm, assistive device (walker, cane, etc), gripper socks, and pt to call before getting OOB       Length of Stay:  Expected LOS: - - -  Actual LOS: [address], RN

## 2023-04-02 NOTE — ED PROVIDER NOTES
EMERGENCY DEPARTMENT HISTORY AND PHYSICAL EXAM      Date: 4/1/2023  Patient Name: Marlys Ceja    History of Presenting Illness     Chief Complaint   Patient presents with    Syncope     Via EMS from home. Pt had a syncopal episode this morning and another one this evening. This evening family called EMS. Pt had gone to the bathroom and then passed out after getting back to his chair. EMS reports pt was diaphoretic and sob. After ambulating to squad, he got short of breath and had a near syncopal episode at that time. Initial sbp was 190; last sbp 114. History Provided By: Patient    HPI: Marlys Ceja, 59 y.o. male with a past medical history significant for medical problems as stated below presents to the ED with cc of chest pain and shortness of breath that has been going on for 24 hours. Reports he had three syncopal episodes at home today and called EMS due to this. Reports he was dizzy with the episodes. This has never happened before. Only hx is DM that is managed without only metformin. Has had right leg swelling for a while. PCP: Johanna Leal MD    No current facility-administered medications on file prior to encounter. Current Outpatient Medications on File Prior to Encounter   Medication Sig Dispense Refill    metFORMIN (GLUCOPHAGE) 500 mg tablet Take 1,000 mg by mouth two (2) times daily (with meals). 1000mg QAM, 500mg QPM      cholecalciferol, vitamin D3, (Vitamin D3) 50 mcg (2,000 unit) tab Take  by mouth. Past History     Past Medical History:  No past medical history on file. Past Surgical History:  No past surgical history on file. Family History:  No family history on file. Social History:        Allergies:  No Known Allergies      Review of Systems   Review of Systems  Per HPI    Physical Exam   Physical Exam  Vital signs reviewed and documented in EMR  Nontoxic and well-appearing  No acute distress  mild tachycardia  CTAB, no incr wob  Abdomen nondistended, soft, nontender  No focal motor deficits  Bilateral LE edema R>L    Diagnostic Study Results     Labs -     Recent Results (from the past 24 hour(s))   CBC WITH AUTOMATED DIFF    Collection Time: 04/01/23  6:05 PM   Result Value Ref Range    WBC 10.2 4.1 - 11.1 K/uL    RBC 4.74 4.10 - 5.70 M/uL    HGB 13.9 12.1 - 17.0 g/dL    HCT 41.6 36.6 - 50.3 %    MCV 87.8 80.0 - 99.0 FL    MCH 29.3 26.0 - 34.0 PG    MCHC 33.4 30.0 - 36.5 g/dL    RDW 14.6 (H) 11.5 - 14.5 %    PLATELET 097 374 - 097 K/uL    MPV 10.3 8.9 - 12.9 FL    NRBC 0.0 0  WBC    ABSOLUTE NRBC 0.00 0.00 - 0.01 K/uL    NEUTROPHILS 53 32 - 75 %    LYMPHOCYTES 34 12 - 49 %    MONOCYTES 11 5 - 13 %    EOSINOPHILS 1 0 - 7 %    BASOPHILS 1 0 - 1 %    IMMATURE GRANULOCYTES 0 0.0 - 0.5 %    ABS. NEUTROPHILS 5.5 1.8 - 8.0 K/UL    ABS. LYMPHOCYTES 3.5 0.8 - 3.5 K/UL    ABS. MONOCYTES 1.1 (H) 0.0 - 1.0 K/UL    ABS. EOSINOPHILS 0.1 0.0 - 0.4 K/UL    ABS. BASOPHILS 0.1 0.0 - 0.1 K/UL    ABS. IMM. GRANS. 0.0 0.00 - 0.04 K/UL    DF AUTOMATED     METABOLIC PANEL, COMPREHENSIVE    Collection Time: 04/01/23  6:05 PM   Result Value Ref Range    Sodium 137 136 - 145 mmol/L    Potassium 3.5 3.5 - 5.1 mmol/L    Chloride 102 97 - 108 mmol/L    CO2 25 21 - 32 mmol/L    Anion gap 10 5 - 15 mmol/L    Glucose 309 (H) 65 - 100 mg/dL    BUN 14 6 - 20 MG/DL    Creatinine 1.07 0.70 - 1.30 MG/DL    BUN/Creatinine ratio 13 12 - 20      eGFR >60 >60 ml/min/1.73m2    Calcium 9.2 8.5 - 10.1 MG/DL    Bilirubin, total 0.9 0.2 - 1.0 MG/DL    ALT (SGPT) 23 12 - 78 U/L    AST (SGOT) 14 (L) 15 - 37 U/L    Alk.  phosphatase 118 (H) 45 - 117 U/L    Protein, total 6.9 6.4 - 8.2 g/dL    Albumin 3.3 (L) 3.5 - 5.0 g/dL    Globulin 3.6 2.0 - 4.0 g/dL    A-G Ratio 0.9 (L) 1.1 - 2.2     TROPONIN-HIGH SENSITIVITY    Collection Time: 04/01/23  6:05 PM   Result Value Ref Range    Troponin-High Sensitivity 541 (HH) 0 - 76 ng/L   NT-PRO BNP    Collection Time: 04/01/23  6:05 PM   Result Value Ref Range    NT pro-BNP 2,959 (H) <125 PG/ML   D DIMER    Collection Time: 04/01/23  6:05 PM   Result Value Ref Range    D-dimer 13.29 (H) 0.00 - 0.65 mg/L FEU   TROPONIN-HIGH SENSITIVITY    Collection Time: 04/01/23  8:08 PM   Result Value Ref Range    Troponin-High Sensitivity 775 (HH) 0 - 76 ng/L   PTT    Collection Time: 04/01/23  8:08 PM   Result Value Ref Range    aPTT 25.5 22.1 - 31.0 sec    aPTT, therapeutic range     58.0 - 77.0 SECS   ANTI-XA UNFRACTIONATED AND LMW HEPARIN    Collection Time: 04/01/23  8:08 PM   Result Value Ref Range    Heparin Xa,Unfrac. and LMW <0.10 IU/mL   ANTI-XA UNFRACTIONATED AND LMW HEPARIN    Collection Time: 04/02/23  2:37 AM   Result Value Ref Range    Heparin Xa,Unfrac. and LMW 0.80 IU/mL   METABOLIC PANEL, BASIC    Collection Time: 04/02/23  2:37 AM   Result Value Ref Range    Sodium 136 136 - 145 mmol/L    Potassium 3.4 (L) 3.5 - 5.1 mmol/L    Chloride 104 97 - 108 mmol/L    CO2 26 21 - 32 mmol/L    Anion gap 6 5 - 15 mmol/L    Glucose 279 (H) 65 - 100 mg/dL    BUN 13 6 - 20 MG/DL    Creatinine 0.88 0.70 - 1.30 MG/DL    BUN/Creatinine ratio 15 12 - 20      eGFR >60 >60 ml/min/1.73m2    Calcium 9.0 8.5 - 10.1 MG/DL   CBC WITH AUTOMATED DIFF    Collection Time: 04/02/23  2:37 AM   Result Value Ref Range    WBC 9.8 4.1 - 11.1 K/uL    RBC 4.45 4.10 - 5.70 M/uL    HGB 13.1 12.1 - 17.0 g/dL    HCT 39.0 36.6 - 50.3 %    MCV 87.6 80.0 - 99.0 FL    MCH 29.4 26.0 - 34.0 PG    MCHC 33.6 30.0 - 36.5 g/dL    RDW 14.7 (H) 11.5 - 14.5 %    PLATELET 728 853 - 334 K/uL    MPV 9.9 8.9 - 12.9 FL    NRBC 0.0 0  WBC    ABSOLUTE NRBC 0.00 0.00 - 0.01 K/uL    NEUTROPHILS 64 32 - 75 %    LYMPHOCYTES 25 12 - 49 %    MONOCYTES 10 5 - 13 %    EOSINOPHILS 1 0 - 7 %    BASOPHILS 0 0 - 1 %    IMMATURE GRANULOCYTES 0 0.0 - 0.5 %    ABS. NEUTROPHILS 6.3 1.8 - 8.0 K/UL    ABS. LYMPHOCYTES 2.5 0.8 - 3.5 K/UL    ABS. MONOCYTES 1.0 0.0 - 1.0 K/UL    ABS. EOSINOPHILS 0.1 0.0 - 0.4 K/UL    ABS.  BASOPHILS 0.0 0.0 - 0.1 K/UL    ABS. IMM. GRANS. 0.0 0.00 - 0.04 K/UL    DF AUTOMATED     TROPONIN-HIGH SENSITIVITY    Collection Time: 04/02/23  2:37 AM   Result Value Ref Range    Troponin-High Sensitivity 600 (HH) 0 - 76 ng/L       Radiologic Studies -   CTA CHEST W OR W WO CONT   Final Result   1. Bilateral moderate to severe burden of pulmonary emboli in the distal right   and left pulmonary arteries to segmental branches. 2.  Right heart strain with RV to LV ratio of approximately 2. Reflux of   contrast into the hepatic veins. 3.  Enlarged main pulmonary trunk suggestive of pulmonary arterial hypertension. 4.  Wedge-shaped opacity in the left upper lobe likely represents pulmonary   infarct. DUPLEX LOWER EXT VENOUS BILAT    (Results Pending)     CT Results  (Last 48 hours)                 04/01/23 1933  CTA CHEST W OR W WO CONT Final result    Impression:  1. Bilateral moderate to severe burden of pulmonary emboli in the distal right   and left pulmonary arteries to segmental branches. 2.  Right heart strain with RV to LV ratio of approximately 2. Reflux of   contrast into the hepatic veins. 3.  Enlarged main pulmonary trunk suggestive of pulmonary arterial hypertension. 4.  Wedge-shaped opacity in the left upper lobe likely represents pulmonary   infarct. Narrative:  EXAM: CTA CHEST W OR W WO CONT       DATE: 4/1/2023 7:33 PM       INDICATION: eval PE       COMPARISON: None       TECHNIQUE: Helical thin section chest CT following uneventful intravenous   administration of nonionic contrast 100 mL of isovue 370 according to   departmental PE protocol. Coronal and sagittal reformats were performed. 3D post   processing was performed. CT dose reduction was achieved through the use of a   standardized protocol tailored for this examination and automatic exposure   control for dose modulation.         FINDINGS: This is a good quality study for the evaluation of pulmonary embolism   to the first subsegmental arterial level. Bilateral pulmonary emboli identified. Centrally, emboli noted in the distal right and left main pulmonary arteries. On   the right, partially or completely occlusive emboli are noted in the upper,   middle, and lower lobar arteries and segmental branches. On the left, partially   or completely occlusive emboli are seen in the upper, lingular, and lower lobar   arteries and segmental branches. Prominent main pulmonary trunk measures 36 mm. CHEST WALL: No axillary or supraclavicular lymphadenopathy. THYROID: No nodule. MEDIASTINUM: No mass or lymphadenopathy. BALTAZAR: No mass or lymphadenopathy. THORACIC AORTA: No aneurysm. HEART: RV to LV ratio of approximately 2-1. Mildly prominent heart size. ESOPHAGUS: No wall thickening or dilatation. TRACHEA/BRONCHI: Patent. PLEURA: No effusion or pneumothorax. LUNGS: Peripheral wedge-shaped opacity in the anterior left upper lobe, 303-106. No masses or suspicious pulmonary nodules. UPPER ABDOMEN: Partially imaged. No acute pathology. BONES: No aggressive bone lesion or fracture. CXR Results  (Last 48 hours)      None              Medical Decision Making   I am the first provider for this patient. I reviewed the vital signs, available nursing notes, past medical history, past surgical history, family history and social history. Vital Signs-Reviewed the patient's vital signs.   Patient Vitals for the past 12 hrs:   Temp Pulse Resp BP SpO2   04/02/23 0114 98.1 °F (36.7 °C) 91 22 135/87 97 %   04/02/23 0019 -- 93 29 (!) 118/100 --   04/02/23 0004 -- 92 28 126/74 --   04/01/23 2349 -- 94 (!) 32 118/74 --   04/01/23 2300 98.4 °F (36.9 °C) 94 25 (!) 137/100 97 %   04/01/23 2219 -- 93 30 117/61 96 %   04/01/23 2204 -- 100 24 (!) 142/84 96 %   04/01/23 2149 -- 96 21 129/87 97 %   04/01/23 2134 -- 99 27 (!) 121/105 94 %   04/01/23 2119 -- 99 19 103/71 93 %   04/01/23 2104 -- (!) 101 25 101/85 97 % 04/01/23 2049 -- (!) 101 24 (!) 131/52 96 %   04/01/23 2034 -- (!) 102 24 (!) 140/78 97 %   04/01/23 2019 -- (!) 101 30 (!) 148/86 96 %   04/01/23 2004 -- (!) 101 -- (!) 153/76 95 %   04/01/23 1749 97.9 °F (36.6 °C) (!) 117 16 122/79 94 %     Records Reviewed: Nursing records and medical records reviewed    Medical Decision Making  Patient with hx of DM and obesity presents with cp, sob, and syncope in setting of leg swelling. Found to have submassive PE. Started on heparin ggt and admitted to medicine service. IR was contacted while patient was in the ED and did not recommend additional immediate intervention. Has a troponin elevation which I suspect is related to demand-ischemia rather than a type 1 ACS event. Patient was given PO ASA. Patient with no chest pain currently. Patient appears very well and was surprised that he needed to stay in the hospital with no hypoxia or hypotension do not see immediate need for ICU admission. Amount and/or Complexity of Data Reviewed  Labs: ordered. Decision-making details documented in ED Course. Radiology: ordered and independent interpretation performed. Details: Reviewed CTA and saw bilateral PE  ECG/medicine tests: ordered and independent interpretation performed. Details: EKG as interpreted by me with sinus tachycardia without EKG evidence of right heart strain  Discussion of management or test interpretation with external provider(s): Discussion with Dr. Ezio Jimenez, hospitalist, on admission    Risk  OTC drugs. Prescription drug management. Decision regarding hospitalization. ED Course:   Initial assessment performed. The patients presenting problems have been discussed, and they are in agreement with the care plan formulated and outlined with them. I have encouraged them to ask questions as they arise throughout their visit.          Medications Administered       aspirin tablet 325 mg       Admin Date  04/01/2023 Action  Given Dose  325 mg Route  Oral Administered By  Drew Ramos RN              heparin (porcine) 1,000 unit/mL injection 10,000 Units       Admin Date  04/01/2023 Action  Given Dose  10,000 Units Route  IntraVENous Administered By  Drew Ramos RN              heparin 25,000 units in D5W 250 ml infusion       Admin Date  04/01/2023 Action  New Bag Dose  13 Units/kg/hr Rate  20.9 mL/hr Route  IntraVENous Administered By  Drew Ramos RN              iomeprol (IOMERON 350) 350 mg iodine /mL (71.44 %) contrast injection 100 mL       Admin Date  04/01/2023 Action  Given Dose  100 mL Route  IntraVENous Administered By  Michelle Pereira              sodium chloride 0.9 % bolus infusion 1,000 mL       Admin Date  04/01/2023 Action  New Bag Dose  1,000 mL Route  IntraVENous Administered By  Drew Ramos RN                  Critical Care:  I have spent 35 minutes of critical care time in evaluating and treating this patient. This includes time spent at bedside, time with family and decision makers, documentation, review of labs and imaging, and/or consultation with specialists. It does not include time spent on separately billed procedures. This patient presents with a critical illness or injury that acutely impairs one or more vital organ systems such that there is a high probability of imminent or life threatening deterioration in the patient's condition. This case involved decision making of high complexity to assess, manipulate, and support vital organ system failure and/or to prevent further life threatening deterioration of the patient's condition. Failure to initiate these interventions on an urgent basis would likely result in sudden, clinically significant or life threatening deterioration in the patient's condition.     Abnormal findings supporting critical care: PE with CT evidence of right heart strain and biomarker elevation  Interventions to support critical care: IVF, heparin ggt  Failure to intervene may result in: worsened cardiovascular status, hypoxia, death    Disposition:  Admission to stepdown unit    Diagnosis     Clinical Impression:   1. Pulmonary embolism, bilateral ([address]        Attestations:    Marcus Hand MD    Please note that this dictation was completed with [employer], the computer voice recognition software. Quite often unanticipated grammatical, syntax, homophones, and other interpretive errors are inadvertently transcribed by the computer software. Please disregard these errors. Please excuse any errors that have escaped final proofreading. Thank you.

## 2023-04-02 NOTE — CONSULTS
Name: Chantelle Cerda   : 1960 Admit Date: 2023   Phone: [phone]  Room: [facility]2/   PCP: Riki Rob MD  MRN: [id number]   Date: 2023  Code: Full Code        HPI:      Chart and notes reviewed. Data reviewed. I review the patient's current medications in the medical record at each encounter. I have evaluated and examined the patient. 11:17 AM       History was obtained from patient. I was asked by Whitney Peoples DO to see Dalton Gila in consultation for a chief complaint of PE with right heart strain, elevated trop, pulmonary htn. History of Present Illness:  Hilda Godoy is a 60 yo gentleman with a PMH of tobaco abuse and morbid obesity that presented to [id number] [place] [address] after several syncopal episodes at home that started on Friday. He reports that he passed out once on Friday and four times yesterday before coming to the ED. Denies SOB or CP. No cough/hemoptysis. Denies worsening edema but states that he always has swelling, particularly in his right LE. He walks with a walker at home but is sedentary. He denies history of blood clots. Denies recent travel, surgery, or history of cancer. He smokes 1.5ppd for the last 30+ years. Denies history of lung disease. Denies family history of VTE. Images:  Personally reviewed. CTA chest: Bilateral moderate to severe burden of pulmonary emboli in the distal right and left pulmonary arteries to segmental branches. Right heart strain with RV to LV ratio of approximately 2. Reflux of contrast into the hepatic veins. Enlarged main pulmonary trunk. Pulmonary infarct in [place]. K 3.4  Trop 600    No past medical history on file. No past surgical history on file. No family history on file.     Social History     Tobacco Use    Smoking status: Not on file    Smokeless tobacco: Not on file   Substance Use Topics    Alcohol use: Not on file       No Known Allergies    Current Facility-Administered Medications   Medication Dose Route Frequency    heparin 25,000 units in D5W 250 ml infusion  13-36 Units/kg/hr IntraVENous TITRATE    heparin (porcine) 1,000 unit/mL injection 5,000 Units  5,000 Units IntraVENous PRN    Or    heparin (porcine) 1,000 unit/mL injection 10,000 Units  10,000 Units IntraVENous PRN    nicotine buccal (POLACRILEX) lozenge 2 mg  2 mg Oral Q2H PRN    insulin lispro (HUMALOG) injection   SubCUTAneous AC&HS    glucose chewable tablet 16 g  4 Tablet Oral PRN    glucagon (GLUCAGEN) injection 1 mg  1 mg IntraMUSCular PRN    dextrose 10% infusion 0-250 mL  0-250 mL IntraVENous PRN    sodium chloride (NS) flush 5-40 mL  5-40 mL IntraVENous Q8H    sodium chloride (NS) flush 5-40 mL  5-40 mL IntraVENous PRN    acetaminophen (TYLENOL) tablet 650 mg  650 mg Oral Q6H PRN    Or    acetaminophen (TYLENOL) suppository 650 mg  650 mg Rectal Q6H PRN    polyethylene glycol (MIRALAX) packet 17 g  17 g Oral DAILY PRN    ondansetron (ZOFRAN ODT) tablet 4 mg  4 mg Oral Q8H PRN    Or    ondansetron (ZOFRAN) injection 4 mg  4 mg IntraVENous Q6H PRN         REVIEW OF SYSTEMS   Negative except as stated in the HPI. Physical Exam:   Visit Vitals  BP (!) 139/90 (BP 1 Location: Left lower arm, BP Patient Position: At rest;Lying;Supine)   Pulse 82   Temp 98.3 °F (36.8 °C)   Resp 22   Ht 5' 6\" (1.676 m)   Wt (!) 160.6 kg (354 lb)   SpO2 95%   BMI 57.14 kg/m²       General:  Alert, cooperative, no distress, appears stated age. Head:  Normocephalic, without obvious abnormality, atraumatic. Eyes:  Conjunctivae/corneas clear. PERRL, EOMs intact. Nose: Nares normal. Septum midline. Mucosa normal.    Throat: Lips, mucosa, and tongue normal. Teeth and gums normal.   Neck: Supple, symmetrical, trachea midline, no adenopathy       Lungs:   Clear to auscultation bilaterally. Chest wall:  No tenderness or deformity. Heart:  Regular rate and rhythm, S1, S2 normal, no murmur, click, rub or gallop. Abdomen:   Soft, non-tender. Bowel sounds normal. No masses,  No organomegaly. Obese   Extremities: Extremities normal, atraumatic, no cyanosis. Edema RLE>LLE   Pulses: 2+ and symmetric all extremities. Skin: Skin color, texture, turgor normal. No rashes or lesions   Lymph nodes: Cervical, supraclavicular, and axillary nodes normal.   Neurologic: Grossly nonfocal       Lab Results   Component Value Date/Time    Sodium 136 04/02/2023 02:37 AM    Potassium 3.4 (L) 04/02/2023 02:37 AM    Chloride 104 04/02/2023 02:37 AM    CO2 26 04/02/2023 02:37 AM    BUN 13 04/02/2023 02:37 AM    Creatinine 0.88 04/02/2023 02:37 AM    Glucose 279 (H) 04/02/2023 02:37 AM    Calcium 9.0 04/02/2023 02:37 AM       Lab Results   Component Value Date/Time    WBC 9.8 04/02/2023 02:37 AM    HGB 13.1 04/02/2023 02:37 AM    PLATELET 041 94/20/6376 02:37 AM    MCV 87.6 04/02/2023 02:37 AM       Lab Results   Component Value Date/Time    aPTT 25.5 04/01/2023 08:08 PM    Alk.  phosphatase 118 (H) 04/01/2023 06:05 PM    Protein, total 6.9 04/01/2023 06:05 PM    Albumin 3.3 (L) 04/01/2023 06:05 PM    Globulin 3.6 04/01/2023 06:05 PM       No results found for: IRON, FE, TIBC, IBCT, PSAT, FERR    No results found for: SR, CRP, JITENDRA, ANAIGG, RA, RPR, RPRT, VDRLT, VDRLS, TSH, TSHEXT     No results found for: PH, PHI, PCO2, PCO2I, PO2, PO2I, HCO3, HCO3I, FIO2, FIO2I    No results found for: CPK, RCK1, RCK2, RCK3, RCK4, CKNDX, CKND1, TROPT, TROIQ, BNPP, BNP     No results found for: CULT    No results found for: TOXA1, RPR, HBCM, HBSAG, HAAB, HCAB1, HAAT, G6PD, CRYAC, HIVGT, HIVR, HIV1, HIV12, HIVPC, HIVRPI    No results found for: VANCT, CPK    No results found for: COLOR, APPRN, SPGRU, IRWIN, PROTU, GLUCU, KETU, BILU, BLDU, UROU, DALLAS, LEUKU, WBCU, RBCU, UEPI, BACTU, CASTS, UCRY    IMPRESSION  Acute Pulmonary Emboli: with evidence of right heart strain  Morbid Obesity  Tobacco abuse    PLAN  Keep sats >90%; currently on RA  Agree with Heparin  Bedrest  Check BLE dopplers  Check ECHO  Will need Eliquis for 6 months  IR has been consulted by primary team; currently stable  Would benefit from outpatient sleep/pulmonary follow-up    Thank you very much for the consult.       Rodney Blanchard NP

## 2023-04-02 NOTE — ED NOTES
eTRANSFER SBAR NOTE    IP UNIT CALLED NOTE IS READY: Yes Spoke to Bem    IF there are questions Call transferring nurse (your name) Marprakash Hodgson at phone # [phone]    SITUATION/BACKGROUND:    Patient is being transferred to [address], Room# [phone]    Patient's Chief Complaint on arrival to ED was SOA and is admitted for PE.     CODE STATUS: Full Code    VITAL SIGNS (MUST BE WITHIN 1 HOUR OF TRANSFER TO IP UNIT:    TEMP: 98.4  PULSE: 93  RESP: 21  BP: 137/100  PAIN SCORE: 0  MEWS: 2     ISOLATION/PRECAUTIONS: No   ISOLATION TYPE: NA    Called outstanding consults:  IR routine in am      Are there sign and held orders that need to be released? No   Are all STAT orders completed: Yes    STAT labs collected: Yes  REPEAT LACTIC ACID DUE? NA  TIME DUE: NA  Critical Labs Results? Yes What? Troponin 775    Are there any titrating drips? Yes  If so what? Heparin    The following personal items will be sent with the patient during transfer to the floor: All valuables:   ITEM:    ITEM: Visual Aid: None  ITEM:           ASSESSMENT:    CIWA Assessment: No  Last Score: NA    NEURO:   NIH SCORE:        VLADIMIR SCREENING:          NEURO ASSESSMENT:        If a Stroke Case . .. When are the next V/S, nuero, NIH due? NA    Is patient impulsive? No   Is patient oriented? Yes   Do they follow commands? Yes  Is the patient ambulatory? Yes Device need standby    FALL RISK? Yes   Is the White 1 Assessment completed? Yes  INTERVENTIONS: standby    INTEGUMENTARY:   IS THE PATIENT UNDRESSED? Yes, from waist up/refused to take off pants  WOUNDS PRESENT? Pt denies, won't take off pants at this time  On admit to ED  . ARE THE WOUNDS DOCUMENTED? Eula [place]os RESPIRATORY:   Is patient on Oxygen? No    OXYGEN: Oxygen Therapy  O2 Device: None (Room air) (04/01/23 2000)  IS patient on VENT? No      CARDIAC:   Is cardiac monitoring ordered? Yes Last Rhythm: SR  Patient to transfer with tele box on? Yes  Is patient using a LIFE VEST?  No     LINE ACCESS:   Peripheral IV 23 Left Antecubital (Active)       Peripheral IV 23 Left;Posterior Hand (Active)        /GI:   CONTINENT BOWEL/BLADDER? Yes  URINARY OUTPUT: voiding   Written Order for Sung Cath? NA  CHRONIC OR ACUTE? NA   If CHRONIC, is it 1days old, was it changed prior to specimen collection? NA  WAS UA WITH REFLEX SENT TO LAB? NA  IF NO,  COLLECT AND SEND PRIOR TO TRANSPORT TO INPATIENT AREA    RESTRAINTS IN USE: No      IS DOCUMENTATION COMPLETE:  NA  Is there a current Order? NA  When does it ?  NA    Additional details as Needed:

## 2023-04-03 ENCOUNTER — APPOINTMENT (OUTPATIENT)
Dept: NON INVASIVE DIAGNOSTICS | Age: 63
DRG: 176 | End: 2023-04-03
Attending: STUDENT IN AN ORGANIZED HEALTH CARE EDUCATION/TRAINING PROGRAM
Payer: MEDICARE

## 2023-04-03 LAB
ANION GAP SERPL CALC-SCNC: 6 MMOL/L (ref 5–15)
ATRIAL RATE: 120 BPM
BASOPHILS # BLD: 0.1 K/UL (ref 0–0.1)
BASOPHILS NFR BLD: 1 % (ref 0–1)
BUN SERPL-MCNC: 12 MG/DL (ref 6–20)
BUN/CREAT SERPL: 15 (ref 12–20)
CALCIUM SERPL-MCNC: 9.2 MG/DL (ref 8.5–10.1)
CALCULATED P AXIS, ECG09: 23 DEGREES
CALCULATED R AXIS, ECG10: 45 DEGREES
CALCULATED T AXIS, ECG11: 92 DEGREES
CHLORIDE SERPL-SCNC: 106 MMOL/L (ref 97–108)
CO2 SERPL-SCNC: 26 MMOL/L (ref 21–32)
CREAT SERPL-MCNC: 0.79 MG/DL (ref 0.7–1.3)
DIAGNOSIS, 93000: NORMAL
DIFFERENTIAL METHOD BLD: ABNORMAL
ECHO AV AREA PEAK VELOCITY: 4.3 CM2
ECHO AV AREA VTI: 4.3 CM2
ECHO AV AREA/BSA PEAK VELOCITY: 1.7 CM2/M2
ECHO AV AREA/BSA VTI: 1.7 CM2/M2
ECHO AV MEAN GRADIENT: 3 MMHG
ECHO AV MEAN VELOCITY: 0.9 M/S
ECHO AV PEAK GRADIENT: 6 MMHG
ECHO AV PEAK VELOCITY: 1.2 M/S
ECHO AV VELOCITY RATIO: 0.75
ECHO AV VTI: 21.4 CM
ECHO LA DIAMETER INDEX: 0.9 CM/M2
ECHO LA DIAMETER: 2.3 CM
ECHO LV E' LATERAL VELOCITY: 7 CM/S
ECHO LV E' SEPTAL VELOCITY: 7 CM/S
ECHO LV FRACTIONAL SHORTENING: 25 % (ref 28–44)
ECHO LV INTERNAL DIMENSION DIASTOLE INDEX: 2 CM/M2
ECHO LV INTERNAL DIMENSION DIASTOLIC: 5.1 CM (ref 4.2–5.9)
ECHO LV INTERNAL DIMENSION SYSTOLIC INDEX: 1.49 CM/M2
ECHO LV INTERNAL DIMENSION SYSTOLIC: 3.8 CM
ECHO LV IVSD: 1.3 CM (ref 0.6–1)
ECHO LV MASS 2D: 241.2 G (ref 88–224)
ECHO LV MASS INDEX 2D: 94.6 G/M2 (ref 49–115)
ECHO LV POSTERIOR WALL DIASTOLIC: 1.1 CM (ref 0.6–1)
ECHO LV RELATIVE WALL THICKNESS RATIO: 0.43
ECHO LVOT AREA: 5.7 CM2
ECHO LVOT AV VTI INDEX: 0.77
ECHO LVOT DIAM: 2.7 CM
ECHO LVOT MEAN GRADIENT: 2 MMHG
ECHO LVOT PEAK GRADIENT: 3 MMHG
ECHO LVOT PEAK VELOCITY: 0.9 M/S
ECHO LVOT STROKE VOLUME INDEX: 37 ML/M2
ECHO LVOT SV: 94.4 ML
ECHO LVOT VTI: 16.5 CM
ECHO MV A VELOCITY: 0.73 M/S
ECHO MV E DECELERATION TIME (DT): 221 MS
ECHO MV E VELOCITY: 0.57 M/S
ECHO MV E/A RATIO: 0.78
ECHO MV E/E' LATERAL: 8.14
ECHO MV E/E' RATIO (AVERAGED): 8.14
ECHO MV E/E' SEPTAL: 8.14
ECHO PV MAX VELOCITY: 0.8 M/S
ECHO PV PEAK GRADIENT: 3 MMHG
ECHO RV TAPSE: 1.9 CM (ref 1.7–?)
ECHO TV REGURGITANT MAX VELOCITY: 2.6 M/S
ECHO TV REGURGITANT PEAK GRADIENT: 27 MMHG
EOSINOPHIL # BLD: 0.1 K/UL (ref 0–0.4)
EOSINOPHIL NFR BLD: 1 % (ref 0–7)
ERYTHROCYTE [DISTWIDTH] IN BLOOD BY AUTOMATED COUNT: 14.9 % (ref 11.5–14.5)
EST. AVERAGE GLUCOSE BLD GHB EST-MCNC: 183 MG/DL
GLUCOSE BLD STRIP.AUTO-MCNC: 211 MG/DL (ref 65–117)
GLUCOSE BLD STRIP.AUTO-MCNC: 213 MG/DL (ref 65–117)
GLUCOSE SERPL-MCNC: 198 MG/DL (ref 65–100)
HBA1C MFR BLD: 8 % (ref 4–5.6)
HCT VFR BLD AUTO: 37.2 % (ref 36.6–50.3)
HGB BLD-MCNC: 12.2 G/DL (ref 12.1–17)
IMM GRANULOCYTES # BLD AUTO: 0.1 K/UL (ref 0–0.04)
IMM GRANULOCYTES NFR BLD AUTO: 1 % (ref 0–0.5)
LYMPHOCYTES # BLD: 2.7 K/UL (ref 0.8–3.5)
LYMPHOCYTES NFR BLD: 36 % (ref 12–49)
MAGNESIUM SERPL-MCNC: 1.8 MG/DL (ref 1.6–2.4)
MCH RBC QN AUTO: 29.1 PG (ref 26–34)
MCHC RBC AUTO-ENTMCNC: 32.8 G/DL (ref 30–36.5)
MCV RBC AUTO: 88.8 FL (ref 80–99)
MONOCYTES # BLD: 0.8 K/UL (ref 0–1)
MONOCYTES NFR BLD: 11 % (ref 5–13)
NEUTS SEG # BLD: 3.9 K/UL (ref 1.8–8)
NEUTS SEG NFR BLD: 50 % (ref 32–75)
NRBC # BLD: 0 K/UL (ref 0–0.01)
NRBC BLD-RTO: 0 PER 100 WBC
P-R INTERVAL, ECG05: 172 MS
PHOSPHATE SERPL-MCNC: 3.7 MG/DL (ref 2.6–4.7)
PLATELET # BLD AUTO: 179 K/UL (ref 150–400)
PMV BLD AUTO: 10.3 FL (ref 8.9–12.9)
POTASSIUM SERPL-SCNC: 3.6 MMOL/L (ref 3.5–5.1)
Q-T INTERVAL, ECG07: 310 MS
QRS DURATION, ECG06: 98 MS
QTC CALCULATION (BEZET), ECG08: 438 MS
RBC # BLD AUTO: 4.19 M/UL (ref 4.1–5.7)
SERVICE CMNT-IMP: ABNORMAL
SERVICE CMNT-IMP: ABNORMAL
SODIUM SERPL-SCNC: 138 MMOL/L (ref 136–145)
UFH PPP CHRO-ACNC: 0.24 IU/ML
UFH PPP CHRO-ACNC: 0.43 IU/ML
VENTRICULAR RATE, ECG03: 120 BPM
WBC # BLD AUTO: 7.7 K/UL (ref 4.1–11.1)

## 2023-04-03 PROCEDURE — 99223 1ST HOSP IP/OBS HIGH 75: CPT | Performed by: INTERNAL MEDICINE

## 2023-04-03 PROCEDURE — 83036 HEMOGLOBIN GLYCOSYLATED A1C: CPT

## 2023-04-03 PROCEDURE — 36415 COLL VENOUS BLD VENIPUNCTURE: CPT

## 2023-04-03 PROCEDURE — 84100 ASSAY OF PHOSPHORUS: CPT

## 2023-04-03 PROCEDURE — 85025 COMPLETE CBC W/AUTO DIFF WBC: CPT

## 2023-04-03 PROCEDURE — 74011636637 HC RX REV CODE- 636/637: Performed by: STUDENT IN AN ORGANIZED HEALTH CARE EDUCATION/TRAINING PROGRAM

## 2023-04-03 PROCEDURE — 74011250636 HC RX REV CODE- 250/636: Performed by: STUDENT IN AN ORGANIZED HEALTH CARE EDUCATION/TRAINING PROGRAM

## 2023-04-03 PROCEDURE — 82962 GLUCOSE BLOOD TEST: CPT

## 2023-04-03 PROCEDURE — 97161 PT EVAL LOW COMPLEX 20 MIN: CPT

## 2023-04-03 PROCEDURE — 83735 ASSAY OF MAGNESIUM: CPT

## 2023-04-03 PROCEDURE — 93306 TTE W/DOPPLER COMPLETE: CPT

## 2023-04-03 PROCEDURE — 85520 HEPARIN ASSAY: CPT

## 2023-04-03 PROCEDURE — 97116 GAIT TRAINING THERAPY: CPT

## 2023-04-03 PROCEDURE — 97165 OT EVAL LOW COMPLEX 30 MIN: CPT

## 2023-04-03 PROCEDURE — 97535 SELF CARE MNGMENT TRAINING: CPT

## 2023-04-03 PROCEDURE — 80048 BASIC METABOLIC PNL TOTAL CA: CPT

## 2023-04-03 PROCEDURE — 74011250636 HC RX REV CODE- 250/636: Performed by: INTERNAL MEDICINE

## 2023-04-03 RX ORDER — APIXABAN 5 MG (74)
KIT ORAL
Qty: 1 DOSE PACK | Refills: 0 | Status: SHIPPED | OUTPATIENT
Start: 2023-04-03

## 2023-04-03 RX ADMIN — Medication 3 UNITS: at 08:18

## 2023-04-03 RX ADMIN — HEPARIN SODIUM 5000 UNITS: 1000 INJECTION INTRAVENOUS; SUBCUTANEOUS at 06:05

## 2023-04-03 RX ADMIN — PERFLUTREN 2 ML: 6.52 INJECTION, SUSPENSION INTRAVENOUS at 11:00

## 2023-04-03 RX ADMIN — Medication 3 UNITS: at 11:52

## 2023-04-03 RX ADMIN — HEPARIN SODIUM AND DEXTROSE 13 UNITS/KG/HR: 10000; 5 INJECTION INTRAVENOUS at 04:53

## 2023-04-03 NOTE — DISCHARGE SUMMARY
Hospitalist Discharge Summary     Patient ID:  Kvng Salcedo  [id number]  2 y.o.  1960    PCP on record: Juancho Castro MD    Admit date: 4/1/2023  Discharge date and time: 4/3/2023      DISCHARGE DIAGNOSIS:    Bilateral pulmonary emboli  Concern for RLE DVT  Evidence of right heart strain  Non-insulin-dependent diabetes mellitus  Tobacco use      CONSULTATIONS:  IP CONSULT TO INTERVENTIONAL RADIOLOGY  IP CONSULT TO HEMATOLOGY  IP CONSULT TO CARDIOLOGY  IP CONSULT TO PULMONOLOGY    Excerpted HPI from H&P of Mally Freedman DO:    Kvng Salcedo is a 59 y.o.  male with pertinent past medical history of non-insulin-dependent diabetes mellitus, vitamin D deficiency, obesity class III by BMI 57.14 who presents with complaints of shortness of breath and multiple episodes of syncope since yesterday. He denies any other associated symptoms including chest pain, cough, fever/chills, nausea/vomiting, headache. Patient does report he has noticed increased swelling in his right lower extremity over the past few days but denies trauma/infection/prolonged immobility and reports no history of blood clots. ROS otherwise negative. Patient reports tobacco use (0.25-0.5 pack/day). He denies alcohol or illicit drug use. In the ED, patient afebrile, tachycardic 100s-110s, tachypneic in mid 20s, hypertensive 140s/80s, saturating mid 90s on room air. CTA chest demonstrates bilateral moderate to severe burden of pulmonary emboli of distal right and left pulmonary arteries to the segmental branches with evidence of right heart strain and reflux of contrast into hepatic veins with enlarged main pulmonary trunk suggestive of pulmonary artery hypertension and wedge-shaped opacity in left upper lobe likely representing pulmonary infarct.   Labs demonstrate: High sensitive troponin 541-> 775, proBNP 2959, CBC unremarkable, D-dimer 13.29, sodium 137, potassium 3.5, glucose 309, BUN 14, creatinine 1.07.  Patient started on heparin drip and given full-strength aspirin by ED physician.  ______________________________________________________________________  DISCHARGE SUMMARY/HOSPITAL COURSE:  for full details see H&P, daily progress notes, labs, consult notes. Patient was found to have bilateral pulmonary emboli. Patient did not require any supplemental oxygen. He did not have any hemodynamic compromise. He was evaluated by interventional radiology did not feel the patient needed thrombectomy. Patient was also seen by pulmonology as well as hematology. He was initially placed on heparin drip. He was transitioned to Eliquis as an outpatient. He also underwent an echocardiogram which did not show significant abnormality. Patient did undergo an ambulatory pulse ox challenge for which she did not require any supplemental oxygen. Patient will need extensive follow-up with his primary care, pulmonology and hematology as an outpatient. Patient verbalized understanding.        _______________________________________________________________________  Patient seen and examined by me on discharge day. Pertinent Findings:  Gen:    Not in distress  Chest: Clear lungs  CVS:   Regular rhythm. No edema  Abd:  Soft, not distended, not tender  Neuro:  Alert, Oriented x 4, grossly non focal exam  _______________________________________________________________________  DISCHARGE MEDICATIONS:   Current Discharge Medication List        START taking these medications    Details   apixaban (Eliquis DVT-PE Treat 30D Start) 5 mg (74 tabs) starter pack Take 10 mg (two 5 mg tablets) by mouth twice a day for 7 days Followed by 5 mg (one 5 mg tablet) by mouth twice a day  Qty: 1 Dose Pack, Refills: 0  Start date: 4/3/2023           CONTINUE these medications which have NOT CHANGED    Details   metFORMIN (GLUCOPHAGE) 500 mg tablet Take 1,000 mg by mouth two (2) times daily (with meals).  1000mg QAM, 500mg QPM cholecalciferol, vitamin D3, (Vitamin D3) 50 mcg (2,000 unit) tab Take  by mouth. My Recommended Diet, Activity, Wound Care, and follow-up labs are listed in the patient's Discharge Insturctions which I have personally completed and reviewed. Risk of deterioration: Moderate    Condition at Discharge:  Stable  _____________________________________________________________________    Disposition  Home with family, no needs  ____________________________________________________________________    Care Plan discussed with:   Patient, Family, RN, Care Manager, Consultant    ____________________________________________________________________    Code Status: Full Code  ____________________________________________________________________      Condition at Discharge:  Stable  _____________________________________________________________________  Follow up with:   PCP : Lyssa Ruelas MD  Follow-up Information       Follow up With Specialties Details Why Contact Info    Lyssa Ruelas MD Internal Medicine Physician Go on 4/10/2023 at 9:00 a.m. for your PCP hospital follow up.  [facility]      Gayle Rosario MD Hematology and Oncology, Internal Medicine Physician, Hematology Physician, Oncology Follow up  [id number] [address]      Milli Laboy MD Internal Medicine Physician, Pulmonary Disease Follow up  [facility] [address] [facility] [address]  [facility]  [phone]                  Total time in minutes spent coordinating this discharge (includes going over instructions, follow-up, prescriptions, and preparing report for sign off to her PCP) :  35 minutes    Signed:  Ronen Mack MD

## 2023-04-03 NOTE — CONSULTS
[address] [facility]  [address] [facility], [address] [place] [address] Nicolas Ramos, [address]  [phone]      Hematology/ Oncology Consult Note      Reason for consult:     Naye Mcginnis is a 59 y.o. male who we have been asked to see by Dr. Toni Patterson for acute PE. Subjective:     Naye Mcginnis is a 28-year-old super morbidly obese male patient admitted to [facility] for bilateral pulmonary emboli. Past medical history includes diabetes, active tobacco use, super morbid obesity and falls. Patient seen at bedside. Reports that he lives in a 1 bedroom apartment which she leaves and frequently. He does endorse a sedentary lifestyle as well as active tobacco use. Discussed risk factors for PE/DVT include super morbid obesity, tobacco abuse and sedentary lifestyle. Encouraged exercise and smoking cessation. Patient reports that he does not have a family history of blood clots. Reports that he thinks his father may have had some blood clots that sound cardiac in nature and related to atrial fibrillation. Reports his father had several cardiac issues and was also diabetic. He has never had a blood clot before. Review of Systems:  Pertinent items are noted in the History of Present Illness. No past medical history on file. No past surgical history on file. No family history on file.   Social History     Tobacco Use    Smoking status: Not on file    Smokeless tobacco: Not on file   Substance Use Topics    Alcohol use: Not on file      Current Facility-Administered Medications   Medication Dose Route Frequency Provider Last Rate Last Admin    heparin 25,000 units in D5W 250 ml infusion  13-36 Units/kg/hr IntraVENous TITRATE [place] Caul, DO 24.1 mL/hr at 04/03/23 0606 15 Units/kg/hr at 04/03/23 0606    heparin (porcine) 1,000 unit/mL injection 5,000 Units  5,000 Units IntraVENous PRN [place] Caul, DO 5,000 Units at 04/03/23 6438    Or    heparin (porcine) 1,000 unit/mL injection 10,000 Units  10,000 Units IntraVENous PRN Karron Hamming, DO        nicotine buccal (POLACRILEX) lozenge 2 mg  2 mg Oral Q2H PRN Karron Hamming, DO        insulin lispro (HUMALOG) injection   SubCUTAneous AC&HS Karron Hamming, DO   3 Units at 04/03/23 1152    glucose chewable tablet 16 g  4 Tablet Oral PRN Karron Hamming, DO        glucagon (GLUCAGEN) injection 1 mg  1 mg IntraMUSCular PRN Karron Hamming, DO        dextrose 10% infusion 0-250 mL  0-250 mL IntraVENous PRN Karron Hamming, DO        sodium chloride (NS) flush 5-40 mL  5-40 mL IntraVENous Q8H Karron Hamming, DO   10 mL at 04/02/23 2120    sodium chloride (NS) flush 5-40 mL  5-40 mL IntraVENous PRN Karron Hamming, DO        acetaminophen (TYLENOL) tablet 650 mg  650 mg Oral Q6H PRN Karron Hamming, DO        Or    acetaminophen (TYLENOL) suppository 650 mg  650 mg Rectal Q6H PRN Karron Hamming, DO        polyethylene glycol (MIRALAX) packet 17 g  17 g Oral DAILY PRN Karron Hamming, DO        ondansetron (ZOFRAN ODT) tablet 4 mg  4 mg Oral Q8H PRN Karron Hamming, DO        Or    ondansetron [facility]) injection 4 mg  4 mg IntraVENous Q6H PRN Karron Hamming, DO            No Known Allergies       Objective:     Patient Vitals for the past 8 hrs:   BP Temp Pulse Resp SpO2 Height Weight   04/03/23 1200 -- -- 85 -- -- -- --   04/03/23 1106 (!) 142/86 97.9 °F (36.6 °C) 80 18 96 % -- --   04/03/23 1043 139/76 -- -- -- -- 5' 6\" (1.676 m) (!) 354 lb (160.6 kg)   04/03/23 0800 -- -- 74 -- -- -- --   04/03/23 0726 -- -- 73 -- 97 % -- --   04/03/23 0716 139/76 98 °F (36.7 °C) 73 16 97 % -- --       Lab Results   Component Value Date/Time    WBC 7.7 04/03/2023 04:46 AM    HGB 12.2 04/03/2023 04:46 AM    HCT 37.2 04/03/2023 04:46 AM    PLATELET 815 26/65/7972 04:46 AM    MCV 88.8 04/03/2023 04:46 AM Physical Exam:   Visit Vitals  BP (!) 142/86 (BP 1 Location: Left lower arm, BP Patient Position: Sitting)   Pulse 85   Temp 97.9 °F (36.6 °C)   Resp 18   Ht 5' 6\" (1.676 m)   Wt (!) 354 lb (160.6 kg)   SpO2 96%   BMI 57.14 kg/m²     General appearance: alert, cooperative, no distress, appears stated age, morbidly obese  Abdomen: soft, non-tender. Bowel sounds normal. No masses,  no organomegaly  Skin: Skin color, texture, turgor normal. No rashes or lesions  Neurologic: Grossly normal    Assessment:     Acute bilateral pulmonary embolism  CTA chest  IMPRESSION  1. Bilateral moderate to severe burden of pulmonary emboli in the distal right  and left pulmonary arteries to segmental branches. 2.  Right heart strain with RV to LV ratio of approximately 2. Reflux of  contrast into the hepatic veins. 3.  Enlarged main pulmonary trunk suggestive of pulmonary arterial hypertension. 4.  Wedge-shaped opacity in the left upper lobe likely represents pulmonary  infarct. Bilateral lower extremity Dopplers were negative    Patient reports worsening shortness of breath and pleuritic chest pain prior to admission  No family history of blood clots, has never experienced blood clot before  Endorses sedentary lifestyle, super morbid obesity and active tobacco use all his risk factors for PE/DVT    Plan:     > Agree with heparin, transition to [address] for discharge when medically stable  > Echo ordered for reevaluation of right heart strain seen on CTA  > BLE dopplers negative for DVT  > No role for hypercoagulable work-up at this time  > Recommend anticoagulation for 3 to 6 months  > Will arrange close outpatient follow-up in OP clinic to assess toleration of anticoagulation in 3 to 4 weeks    Discussed with Dr. Nathalia Ojeda    Thank you for allowing us to participate in the care of Mr. Prakash. We will see closely in the outpatient setting, please call with questions or concerns.     Alessandro Gibbons NP

## 2023-04-03 NOTE — PROGRESS NOTES
Problem: Falls - Risk of  Goal: *Absence of Falls  Description: Document Manolo Benjamin Fall Risk and appropriate interventions in the flowsheet. 4/3/2023 1607 by Myrna East RN  Outcome: Resolved/Met  4/3/2023 1044 by Myrna East RN  Outcome: Progressing Towards Goal  Note: Fall Risk Interventions:                                Problem: Patient Education: Go to Patient Education Activity  Goal: Patient/Family Education  Outcome: Resolved/Met     Problem: Diabetes Self-Management  Goal: *Disease process and treatment process  Description: Define diabetes and identify own type of diabetes; list 3 options for treating diabetes. 4/3/2023 1607 by Myrna East RN  Outcome: Resolved/Met  4/3/2023 1044 by Myrna East RN  Outcome: Progressing Towards Goal  Goal: *Incorporating nutritional management into lifestyle  Description: Describe effect of type, amount and timing of food on blood glucose; list 3 methods for planning meals. Outcome: Resolved/Met  Goal: *Incorporating physical activity into lifestyle  Description: State effect of exercise on blood glucose levels. Outcome: Resolved/Met  Goal: *Developing strategies to promote health/change behavior  Description: Define the ABC's of diabetes; identify appropriate screenings, schedule and personal plan for screenings. Outcome: Resolved/Met  Goal: *Using medications safely  Description: State effect of diabetes medications on diabetes; name diabetes medication taking, action and side effects. Outcome: Resolved/Met  Goal: *Monitoring blood glucose, interpreting and using results  Description: Identify recommended blood glucose targets  and personal targets. Outcome: Resolved/Met  Goal: *Prevention, detection, treatment of acute complications  Description: List symptoms of hyper- and hypoglycemia; describe how to treat low blood sugar and actions for lowering  high blood glucose level.   Outcome: Resolved/Met  Goal: *Prevention, detection and treatment of chronic complications  Description: Define the natural course of diabetes and describe the relationship of blood glucose levels to long term complications of diabetes. Outcome: Resolved/Met  Goal: *Developing strategies to address psychosocial issues  Description: Describe feelings about living with diabetes; identify support needed and support network  Outcome: Resolved/Met  Goal: *Insulin pump training  Outcome: Resolved/Met  Goal: *Sick day guidelines  Outcome: Resolved/Met  Goal: *Patient Specific Goal (EDIT GOAL, INSERT TEXT)  Outcome: Resolved/Met     Problem: Patient Education: Go to Patient Education Activity  Goal: Patient/Family Education  Outcome: Resolved/Met     Problem: Pressure Injury - Risk of  Goal: *Prevention of pressure injury  Description: Document Lucian Scale and appropriate interventions in the flowsheet. 4/3/2023 1607 by Angelique Lee RN  Outcome: Resolved/Met  4/3/2023 1044 by Angelique Lee RN  Outcome: Progressing Towards Goal  Note: Pressure Injury Interventions:             Activity Interventions: Assess need for specialty bed, Increase time out of bed, PT/OT evaluation    Mobility Interventions: PT/OT evaluation    Nutrition Interventions: Document food/fluid/supplement intake                     Problem: Patient Education: Go to Patient Education Activity  Goal: Patient/Family Education  Outcome: Resolved/Met

## 2023-04-03 NOTE — CONSULTS
Name: Chantelle Cerda   : 1960 Admit Date: 2023   Phone: [phone]  Room: [facility]/01   PCP: Bruce Andrew MD  MRN: [id number]   Date: 4/3/2023  Code: Full Code        Interim history :     4/3/23: Previous notes reviewed. Patient without chest pain. No tachycardia. On IV heparin. Smoking less than 1 pack/day. Lives alone. Just started seeing his PCP. Was a CNA for 33 years. No recent leg trauma. No cramps or peripheral edema. Now therapeutic on IV heparin. Pharmacy following anti-Xa level. Bilateral lower extremity venous Dopplers negative for DVT      IMPRESSION  Acute Pulmonary Emboli: with evidence of right heart strain  Morbid Obesity  Tobacco abuse    PLAN  Keep sats >90%; currently on RA  Agree with Heparin  Would ask case management to see how costly DOACs might be for patient. Patient may not be able to afford Eliquis. Does not have supplemental drug coverage. Will need the mechanism to get warfarin if that is the only option as well as to have his pro time checked regularly. Check ECHO  Patient will need outpatient follow-up  Patient will need at least 6 months of therapy given large clot burden. I think the patient will need reimaging in 6 months. Significant clot burden. Would be at risk for CTEPH if blood clots are inadequately treated and unresolved after 6 months. We will be happy to follow-up with the patient in office if he is willing. HPI:      Chart and notes reviewed. Data reviewed. I review the patient's current medications in the medical record at each encounter. I have evaluated and examined the patient. History was obtained from patient. I was asked by Ezio Mead DO to see Kenya Cardenas in consultation for a chief complaint of PE with right heart strain, elevated trop, pulmonary htn.       History of Present Illness:   is a 60 yo gentleman with a PMH of tobaco abuse and morbid obesity that presented to ED [facility] after several syncopal episodes at home that started on Friday. He reports that he passed out once on Friday and four times yesterday before coming to the ED. Denies SOB or CP. No cough/hemoptysis. Denies worsening edema but states that he always has swelling, particularly in his right LE. He walks with a walker at home but is sedentary. He denies history of blood clots. Denies recent travel, surgery, or history of cancer. He smokes 1.5ppd for the last 30+ years. Denies history of lung disease. Denies family history of VTE. Images:  Personally reviewed. CTA chest: Bilateral moderate to severe burden of pulmonary emboli in the distal right and left pulmonary arteries to segmental branches. Right heart strain with RV to LV ratio of approximately 2. Reflux of contrast into the hepatic veins. Enlarged main pulmonary trunk. Pulmonary infarct in [place]. K 3.4  Trop 600    No past medical history on file. No past surgical history on file. No family history on file.     Social History     Tobacco Use    Smoking status: Not on file    Smokeless tobacco: Not on file   Substance Use Topics    Alcohol use: Not on file       No Known Allergies    Current Facility-Administered Medications   Medication Dose Route Frequency    heparin 25,000 units in D5W 250 ml infusion  13-36 Units/kg/hr IntraVENous TITRATE    heparin (porcine) 1,000 unit/mL injection 5,000 Units  5,000 Units IntraVENous PRN    Or    heparin (porcine) 1,000 unit/mL injection 10,000 Units  10,000 Units IntraVENous PRN    nicotine buccal (POLACRILEX) lozenge 2 mg  2 mg Oral Q2H PRN    insulin lispro (HUMALOG) injection   SubCUTAneous AC&HS    glucose chewable tablet 16 g  4 Tablet Oral PRN    glucagon (GLUCAGEN) injection 1 mg  1 mg IntraMUSCular PRN    dextrose 10% infusion 0-250 mL  0-250 mL IntraVENous PRN    sodium chloride (NS) flush 5-40 mL  5-40 mL IntraVENous Q8H    sodium chloride (NS) flush 5-40 mL 5-40 mL IntraVENous PRN    acetaminophen (TYLENOL) tablet 650 mg  650 mg Oral Q6H PRN    Or    acetaminophen (TYLENOL) suppository 650 mg  650 mg Rectal Q6H PRN    polyethylene glycol (MIRALAX) packet 17 g  17 g Oral DAILY PRN    ondansetron (ZOFRAN ODT) tablet 4 mg  4 mg Oral Q8H PRN    Or    ondansetron (ZOFRAN) injection 4 mg  4 mg IntraVENous Q6H PRN         REVIEW OF SYSTEMS   Negative except as stated in the HPI. Physical Exam:   Visit Vitals  BP (!) 142/86 (BP 1 Location: Left lower arm, BP Patient Position: Sitting)   Pulse 85   Temp 97.9 °F (36.6 °C)   Resp 18   Ht 5' 6\" (1.676 m)   Wt (!) 160.6 kg (354 lb)   SpO2 96%   BMI 57.14 kg/m²       General:  Alert, cooperative, no distress, appears stated age. Head:  Normocephalic, without obvious abnormality, atraumatic. Eyes:  Conjunctivae/corneas clear. PERRL, EOMs intact. Nose: Nares normal. Septum midline. Mucosa normal.    Throat: Lips, mucosa, and tongue normal. Teeth and gums normal.   Neck: Supple, symmetrical, trachea midline, no adenopathy       Lungs:   Clear to auscultation bilaterally. Chest wall:  No tenderness or deformity. Heart:  Regular rate and rhythm, S1, S2 normal, no murmur, click, rub or gallop. Abdomen:   Soft, non-tender. Bowel sounds normal. No masses,  No organomegaly. Obese   Extremities: Extremities normal, atraumatic, no cyanosis. Edema RLE>LLE   Pulses: 2+ and symmetric all extremities.    Skin: Skin color, texture, turgor normal. No rashes or lesions   Lymph nodes: Cervical, supraclavicular, and axillary nodes normal.   Neurologic: Grossly nonfocal       Lab Results   Component Value Date/Time    Sodium 138 04/03/2023 04:46 AM    Potassium 3.6 04/03/2023 04:46 AM    Chloride 106 04/03/2023 04:46 AM    CO2 26 04/03/2023 04:46 AM    BUN 12 04/03/2023 04:46 AM    Creatinine 0.79 04/03/2023 04:46 AM    Glucose 198 (H) 04/03/2023 04:46 AM    Calcium 9.2 04/03/2023 04:46 AM    Magnesium 1.8 04/03/2023 04:46 AM Phosphorus 3.7 04/03/2023 04:46 AM       Lab Results   Component Value Date/Time    WBC 7.7 04/03/2023 04:46 AM    HGB 12.2 04/03/2023 04:46 AM    PLATELET 711 79/06/7920 04:46 AM    MCV 88.8 04/03/2023 04:46 AM       Lab Results   Component Value Date/Time    aPTT 25.5 04/01/2023 08:08 PM    Alk. phosphatase 118 (H) 04/01/2023 06:05 PM    Protein, total 6.9 04/01/2023 06:05 PM    Albumin 3.3 (L) 04/01/2023 06:05 PM    Globulin 3.6 04/01/2023 06:05 PM       No results found for: IRON, FE, TIBC, IBCT, PSAT, FERR    No results found for: SR, CRP, JITENDRA, ANAIGG, RA, RPR, RPRT, VDRLT, VDRLS, TSH, TSHEXT, TSHEXT     No results found for: PH, PHI, PCO2, PCO2I, PO2, PO2I, HCO3, HCO3I, FIO2, FIO2I    No results found for: CPK, RCK1, RCK2, RCK3, RCK4, CKNDX, CKND1, TROPT, TROIQ, BNPP, BNP     No results found for: CULT    No results found for: TOXA1, RPR, HBCM, HBSAG, HAAB, HCAB1, HAAT, G6PD, CRYAC, HIVGT, HIVR, HIV1, HIV12, HIVPC, HIVRPI    No results found for: VANCT, CPK    No results found for: COLOR, APPRN, SPGRU, IRWIN, PROTU, GLUCU, KETU, BILU, BLDU, UROU, DALLAS, LEUKU, WBCU, RBCU, UEPI, BACTU, CASTS, UCRY      Thank you very much for the consult.       Tato Hernandez MD

## 2023-04-03 NOTE — PROGRESS NOTES
PCP hospital follow-up transitional care appointment has been scheduled with Dr. Carlos A Orteag on 4/10/23 at 0900. Pending patient discharge.  Bebeto Barba, Care Management Assistant

## 2023-04-03 NOTE — PROGRESS NOTES
PHYSICAL THERAPY EVALUATION/DISCHARGE  Patient: Abel Beebe (64 y.o. male)  Date: 4/3/2023  Primary Diagnosis: Pulmonary embolism, bilateral ([address] [I26.99]       Precautions:          ASSESSMENT  Based on the objective data described below, the patient presents with SOB and syncope, diagnosed with PE s/p admission on 4/1. Pt received seated EOB on room air and agreeable to therapy. Pt tolerated evaluation well. Pt denied SOB during activity and VSS. Pt completed transfers with RW with mod I and tolerated gait training x 25 ft with RW with supervision. Provided cueing for RW management for safety. Pt educated on the importance of frequent mobilization to prevent further deconditioning during remaining hospital stay. Pt appears to be mobilizing at baseline mobility status and no further acute PT needs. Will complete current PT Order. Documentation for home O2:     ROOM AIR    AT REST   O2 SATS  99 HR  109   ROOM AIR WITH ACTIVITY 02 SATS  96 HR  115         Other factors to consider for discharge: none     Further skilled acute physical therapy is not indicated at this time. PLAN :  Recommendation for discharge: (in order for the patient to meet his/her long term goals)  No skilled physical therapy/ follow up rehabilitation needs identified at this time. This discharge recommendation:  Has been made in collaboration with the attending provider and/or case management    IF patient discharges home will need the following DME: none       SUBJECTIVE:   Patient stated I'm supposed to go home today.     OBJECTIVE DATA SUMMARY:   HISTORY:    No past medical history on file. No past surgical history on file. Prior level of function: mod I with rollator within the apartment, RW for community mobility.  Has supportive brother that lives locally, no history of recent falls  Personal factors and/or comorbidities impacting plan of care:     Home Situation  Home Environment: Apartment (upstairs apartment)  # Steps to Enter: 13  Rails to Enter: Yes  [employer] : Right  One/Two Story Residence: [facility]  # of Interior Steps: 13  Living Alone: Yes  Support Systems: Other Family Member(s)  Patient Expects to be Discharged to[de-identified] Home with family assistance  Current DME Used/Available at Home: Dunaway Sparrow, rolling, Dunaway Sparrow, rollator, Shower chair  Tub or Shower Type: Tub/Shower combination    EXAMINATION/PRESENTATION/DECISION MAKING:   Critical Behavior:  Neurologic State: Alert  Orientation Level: Oriented X4  Cognition: Appropriate decision making, Appropriate for age attention/concentration, Appropriate safety awareness  Safety/Judgement: Awareness of environment, Home safety, Insight into deficits  Hearing: Auditory  Auditory Impairment: None  Skin:    Edema:   Range Of Motion:  AROM: Generally decreased, functional           PROM: Generally decreased, functional           Strength:    Strength: Generally decreased, functional                    Tone & Sensation:   Tone: Normal              Sensation: Intact               Coordination:  Coordination: Generally decreased, functional  Vision:      Functional Mobility:  Bed Mobility:              Transfers:  Sit to Stand: Modified independent  Stand to Sit: Modified independent        Bed to Chair: Modified independent              Balance:   Sitting: Intact  Standing: Intact; With support  Ambulation/Gait Training:  Distance (ft): 25 Feet (ft)  Assistive Device: Gait belt;Walker, rolling  Ambulation - Level of Assistance: Supervision        Gait Abnormalities: Decreased step clearance;Trunk sway increased        Base of Support: Widened     Speed/Christal: Pace decreased (<100 feet/min)  Step Length: Right shortened;Left shortened       Based on the above components, the patient evaluation is determined to be of the following complexity level: LOW     Pain Rating:  Pt denied pain    Activity Tolerance:   Good      After treatment patient left in no apparent distress:   Call bell within reach, Side rails x 3, and sitting EOB    COMMUNICATION/EDUCATION:   The patients plan of care was discussed with: Occupational therapist and Registered nurse. Fall prevention education was provided and the patient/caregiver indicated understanding., Patient/family have participated as able in goal setting and plan of care. , and Patient/family agree to work toward stated goals and plan of care.     Thank you for this referral.  Joaquim Barbour, PT, DPT   Time Calculation: 15 mins

## 2023-04-03 NOTE — PROGRESS NOTES
Problem: Falls - Risk of  Goal: *Absence of Falls  Description: Document Sly Villarreal Fall Risk and appropriate interventions in the flowsheet. Outcome: Progressing Towards Goal  Note: Fall Risk Interventions:                                Problem: Diabetes Self-Management  Goal: *Disease process and treatment process  Description: Define diabetes and identify own type of diabetes; list 3 options for treating diabetes. Outcome: Progressing Towards Goal     Problem: Pressure Injury - Risk of  Goal: *Prevention of pressure injury  Description: Document Lucian Scale and appropriate interventions in the flowsheet. Outcome: Progressing Towards Goal  Note: Pressure Injury Interventions:             Activity Interventions: Assess need for specialty bed, Increase time out of bed, PT/OT evaluation    Mobility Interventions: PT/OT evaluation    Nutrition Interventions: Document food/fluid/supplement intake

## 2023-04-03 NOTE — PROGRESS NOTES
0716:Bedside shift change report given to Kae Denson RN (oncoming nurse) by Dominique Parada RN (offgoing nurse). Report included the following information SBAR and Kardex. 1650: Pt discharging with all personal belongings. After Summary reviewed with pt, pt verbalized understanding and has no questions at this time.

## 2023-04-03 NOTE — PROGRESS NOTES
No additional CM needs identified at this time. Transition of Care Plan:     RUR: 6% \"Low risk\"  Disposition: Home with follow up appts  Follow up appointments: PCP & Specialists as needed  DME needed: Has a walker at home  Transportation at Discharge: Pt's brother to provide transport at d/c  [place] or means to access home: Pt has access to home    IM Medicare Letter: 2nd IM letter given: signed copy on chart  Is patient a  and connected with the [place]?  no              Caregiver Contact: Pt's relative Nadir Villarreal [phone]  Discharge Caregiver contacted prior to discharge? No, pt to call his brother  Care Conference needed?: Not at this time       Update 1430 pm: CM notified by assigned RN Tameka Walden that MD is requesting CM to call and check pricing for Eliquis. CM contacted pt's marlyn Alas in [address] [phone] requesting for pricing for the Eliquis. CM notified that pt does not have a co-pay for the medication. MD notified via perfect serve. CM met with pt at bedside, introduced role and discussed d/c planning. Pt is agreeable with d/c planning. Pt notified that he will not have a co-pay for the Eliquis medication. 2nd IM letter given; signed copy on chart. PCP appointment has been scheduled for pt by the assistant CM. PT/OT recommending no needs for pt. No additional CM needs identified at this time. Pt's brother to provide transport at d/c. CM will continue to remain accessible for consult incase addiional CM needs arise prior d/c. Initial note: Chart reviewed for updates. It was discussed in IDR that pt is scheduled to discharge today pending PT/OT recs. CM awaiting PT/OT recs. CM will continue to follow up with d/c planning. Care Management Interventions  PCP Verified by CM: Yes  Palliative Care Criteria Met (RRAT>21 & CHF Dx)?: No  Mode of Transport at Discharge:  Other (see comment) (Brother to provide transport at d/c)  Transition of Care Consult (CM Consult): Discharge Planning  MyChart Signup: No  Discharge Durable Medical Equipment: No  Health Maintenance Reviewed:  (Has a walker at home)  Physical Therapy Consult: Yes  Occupational Therapy Consult: Yes  Speech Therapy Consult: No  Support Systems: Other Family Member(s) (Pt's brother is his main spport system)  Confirm Follow Up Transport: Family (Brother to provide transport at d/c)  The Plan for Transition of Care is Related to the Following Treatment Goals : Home with follow up appts  [employer] Provided?: No  Discharge Location  Patient Expects to be Discharged to[de-identified] Home (Pt si d/c Home with follow up appts)     ELANA Sexton    [phone]

## 2023-04-03 NOTE — PROGRESS NOTES
Problem: Falls - Risk of  Goal: *Absence of Falls  Description: Document Alex Hobson Fall Risk and appropriate interventions in the flowsheet.   Outcome: Progressing Towards Goal  Note: Fall Risk Interventions:                                Problem: Patient Education: Go to Patient Education Activity  Goal: Patient/Family Education  Outcome: Progressing Towards Goal

## 2023-04-03 NOTE — PROGRESS NOTES
OCCUPATIONAL THERAPY EVALUATION/DISCHARGE  Patient: Unique Mendez (47 y.o. male)  Date: 4/3/2023  Primary Diagnosis: Pulmonary embolism, bilateral ([facility]) [I26.99]       Precautions:        ASSESSMENT  Based on the objective data described below, the patient presents with decreased higher level mobility/balance and activity tolerance; however, he is demonstrating a high level of safe functional independence, completing RW level ADLs with Modified [place]. Pt reports using shower chair within tub-shower combo and was educated on safe transfer techniques of using tub-transfer bench; good understanding verbalized, with pt stating he would independently acquire. Given pt Modified [place] with ADL completion, with all DME needs fulfilled, no other acute OT needs identified, with OT answering pt's questions/concerns and pt thanking therapist for his efforts. Current Level of Function (ADLs/self-care): Mod Indep    Functional Outcome Measure: The patient scored /100 on the Barthel Index outcome measure. Other factors to consider for discharge: none     PLAN :  Recommend with staff: OOB meals, Active ADL engagement    Recommendation for discharge: (in order for the patient to meet his/her long term goals)  No skilled occupational therapy/ follow up rehabilitation needs identified at this time. This discharge recommendation:  Has not yet been discussed the attending provider and/or case management    IF patient discharges home will need the following DME: patient owns DME required for discharge       SUBJECTIVE:   Patient stated Thanks for your help, I'll have my brother get me that shower bench.     OBJECTIVE DATA SUMMARY:   HISTORY:   No past medical history on file. No past surgical history on file. Prior Level of Function/Environment/Context:  Mod Independent  Expanded or extensive additional review of patient history:   Home Situation  Home Environment: Apartment (upstairs apartment)  # Steps to Enter: 13  Rails to Enter: Yes  [employer] : Right  One/Two Story Residence: [facility] level  # of Interior Steps: 13  Living Alone: Yes  Support Systems: Other Family Member(s)  Patient Expects to be Discharged to[de-identified] Home with family assistance  Current DME Used/Available at Home: D[employer], rolling, Dunaway Sparrow, rollator, Shower chair  Tub or Shower Type: Tub/Shower combination    Hand dominance: Right    EXAMINATION OF PERFORMANCE DEFICITS:  Cognitive/Behavioral Status:  Neurologic State: Alert  Orientation Level: Oriented X4  Cognition: Appropriate decision making; Appropriate for age attention/concentration; Appropriate safety awareness  Perception: Appears intact  Perseveration: No perseveration noted  Safety/Judgement: Awareness of environment;Home safety; Insight into deficits    Hearing: Auditory  Auditory Impairment: None    Range of Motion:  AROM: Generally decreased, functional  PROM: Generally decreased, functional                      Strength:  Strength: Generally decreased, functional                Coordination:  Coordination: Generally decreased, functional  Fine Motor Skills-Upper: Left Intact; Right Intact    Gross Motor Skills-Upper: Left Intact; Right Intact    Tone & Sensation:  Tone: Normal  Sensation: Intact                      Balance:  Sitting: Intact  Standing: Intact; With support    Functional Mobility and Transfers for ADLs:  Bed Mobility:  Seated EOB upon entry    Transfers:  Sit to Stand: Modified independent  Stand to Sit: Modified independent  Bed to Chair: Modified independent  Bathroom Mobility: Modified independent    ADL Assessment:  Feeding: Independent    Oral Facial Hygiene/Grooming: Modified Independent    Bathing: Modified independent    Type of Bath:  (pt wants to do it himself and gave all the linens needed)    Upper Body Dressing: Independent    Lower Body Dressing: Modified independent    Toileting: Modified independent    ADL Intervention and task modifications:  Patient instructed and indicated understanding the benefits of maintaining activity tolerance, functional mobility, and independence with self care tasks during acute stay  to ensure safe return home and to baseline. Encouraged patient to increase frequency and duration OOB, be out of bed for all meals, perform daily ADLs (as approved by RN/MD regarding bathing etc), and performing functional mobility to/from bathroom. Pt educated on safe transfer techniques, with specific emphasis on proper hand placement to push up from seated surface rather than attempt to pull self up, fully positioning self in-front of desired seated location, feeling chair on back of legs and reaching back with 1-2 UE to slowly lower self to seated position. Cognitive Retraining  Safety/Judgement: Awareness of environment;Home safety; Insight into deficits    Functional Measure:    Barthel Index:  Bathin  Bladder: 10  Bowels: 10  Groomin  Dressing: 10  Feeding: 10  Mobility: 0  Stairs: 0  Toilet Use: 10  Transfer (Bed to Chair and Back): 15  Total: 75/100      The Barthel ADL Index: Guidelines  1. The index should be used as a record of what a patient does, not as a record of what a patient could do. 2. The main aim is to establish degree of independence from any help, physical or verbal, however minor and for whatever reason. 3. The need for supervision renders the patient not independent. 4. A patient's performance should be established using the best available evidence. Asking the patient, friends/relatives and nurses are the usual sources, but direct observation and common sense are also important. However direct testing is not needed. 5. Usually the patient's performance over the preceding 24-48 hours is important, but occasionally longer periods will be relevant. 6. Middle categories imply that the patient supplies over 50 per cent of the effort. 7. Use of aids to be independent is allowed.     Score Interpretation (from [facility])  Independent   60-79 Minimally independent   40-59 Partially dependent   20-39 Very dependent   <20 Totally dependent     -Pop Harley., Barthel, D.W. (1965). Functional evaluation: the Barthel Index. [address] ([address] (1997). The Barthel activities of daily living index: self-reporting versus actual performance in the old (> or = 75 years). Journal of [address] [place] [address] 45(7), [facility], BRENDAN, Kasey Hartman., Alyssa Tolentino. (1999). Measuring the change in disability after inpatient rehabilitation; comparison of the responsiveness of the Barthel Index and Functional [place] Measure. Journal of Neurology, Neurosurgery, and Psychiatry, 66(4), 715-649. LIZ Valentine, MAREK Blackwell, & Kathy Batres MRUBIN (2004) Assessment of post-stroke quality of life in cost-effectiveness studies: The usefulness of the Barthel Index and the EuroQoL-5D. Quality of Life Research, 15, 279-99      Occupational Therapy Evaluation Charge Determination   History Examination Decision-Making   LOW Complexity : Brief history review  LOW Complexity : 1-3 performance deficits relating to physical, cognitive , or psychosocial skils that result in activity limitations and / or participation restrictions  LOW Complexity : No comorbidities that affect functional and no verbal or physical assistance needed to complete eval tasks       Based on the above components, the patient evaluation is determined to be of the following complexity level: LOW   Pain Rating:  No c/o pain    Activity Tolerance:   Good, Fair, and requires rest breaks    After treatment patient left in no apparent distress:    Call bell within reach, Side rails x 3, and sitting EOB (as received); RN aware and following    COMMUNICATION/EDUCATION:   The patients plan of care was discussed with: Physical therapist and Registered nurse.      Thank you for this referral.  Denver Kass, OT  Time Calculation: 17 mins

## 2023-04-03 NOTE — PROGRESS NOTES
Pharmacy Heparin Monitoring    Indication: DVT/PE (suspected)    Factor Xa inhibitor/LMWH use within the past 72 hours? No  If yes, date of last administration: n/a  If yes, when can aPTT nomogram be changed to anti-Xa: n/a  Hypertriglyceridemia (> 414 mg/dL) or hyperbilirubinemia (> 37 mg/dL) present? NO  Recent Labs     04/01/23  1805   TBILI 0.9     Heparin to be monitored using anti-Xa for duration of therapy. Goal: Anti-Xa 0.3-0.7 units/mL    Initial dosing Weight: 160.6  kg    Labs:  Recent Labs     04/03/23  1200 04/03/23  0446 04/02/23  0848 04/02/23  0237 04/01/23 2008   HEPXA 0.43 0.24 0.32   < > <0.10   APTT  --   --   --   --  25.5    < > = values in this interval not displayed.      Recent Labs     04/03/23  0446   HGB 12.2          Current rate:  15 unit/kg/hr    Impression/Plan:   Anti-Xa level therapeutic; continue current rate: 15 unit/kg/hr  PRN bolus dose: No, do not include bolus dose   Infusion rate, PRN bolus dose and Anti-Xa level results were discussed with the nurse: Yes  Next level due in 6 hours (@1800)       Thank you,  Maya Pimentel, KIRKD

## 2023-04-26 DIAGNOSIS — I26.99 PULMONARY EMBOLISM, BILATERAL (HCC): Primary | ICD-10-CM

## 2023-04-26 NOTE — TELEPHONE ENCOUNTER
Pt called to reschedule his appointment for this Thursday. He has only about a week left of eliquis. We will refill x 1 month since he is a hospital follow up.     ANA FROM DR Ibis Gillis ELIQUIS (APIXIBAN) 5MG TAB BY MOUTH BID D 60 R 0

## 2023-05-31 NOTE — PROGRESS NOTES
Gayatri Manley is a 59 y.o. male here for new patient appt for B/L PE diagnosed 4/1/23. Hospital follow up per [facility]. He is taking Eliquis. No past history of blood clots. No recent surgery or travel. 1. Have you been to the ER, urgent care clinic since your last visit? Hospitalized since your last visit? New Pt    2. Have you seen or consulted any other health care providers outside of the [address] [place] [address] since your last visit? Include any pap smears or colon screening.  New Pt

## 2023-06-01 ENCOUNTER — OFFICE VISIT (OUTPATIENT)
Age: 63
End: 2023-06-01
Payer: MEDICARE

## 2023-06-01 VITALS
HEART RATE: 96 BPM | TEMPERATURE: 98.3 F | OXYGEN SATURATION: 94 % | WEIGHT: 305 LBS | BODY MASS INDEX: 49.02 KG/M2 | DIASTOLIC BLOOD PRESSURE: 75 MMHG | SYSTOLIC BLOOD PRESSURE: 132 MMHG | HEIGHT: 66 IN

## 2023-06-01 DIAGNOSIS — I26.99 PULMONARY EMBOLISM, BILATERAL (HCC): Primary | ICD-10-CM

## 2023-06-01 PROCEDURE — G8419 CALC BMI OUT NRM PARAM NOF/U: HCPCS | Performed by: INTERNAL MEDICINE

## 2023-06-01 PROCEDURE — 4004F PT TOBACCO SCREEN RCVD TLK: CPT | Performed by: INTERNAL MEDICINE

## 2023-06-01 PROCEDURE — G8427 DOCREV CUR MEDS BY ELIG CLIN: HCPCS | Performed by: INTERNAL MEDICINE

## 2023-06-01 PROCEDURE — 99204 OFFICE O/P NEW MOD 45 MIN: CPT | Performed by: INTERNAL MEDICINE

## 2023-06-01 PROCEDURE — 3017F COLORECTAL CA SCREEN DOC REV: CPT | Performed by: INTERNAL MEDICINE

## 2023-06-01 NOTE — PROGRESS NOTES
[facility]  [address], [address] [facility] [address], [address]  [phone]    Hematology Note        Patient: Lake Russell MRN: [id number]  SSN: [id number]    YOB: 1960  Age: 59 y.o. Sex: male      Subjective:      Lake Russell is a 59 y.o. male who I am seeing for a new diagnosis of large bilateral pulmonary emboli which was spontaneous. He experienced a few episodes of syncope. He came to the ED. CTA revealed bilateral moderate to severe burden of pulmonary emboli in the distal right and left pulmonary arteries to segmental branches. He is currently taking Apixaban. He is doing fair. Review of Systems:  Constitutional: negative  Eyes: negative  Ears, Nose, Mouth, Throat, and Face: negative  Respiratory: negative  Cardiovascular: negative  Gastrointestinal: negative  Genitourinary:negative  Integument/Breast: negative  Hematologic/Lymphatic: negative  Musculoskeletal:negative  Neurological: negative        Past Medical History:   Diagnosis Date    Pulmonary embolism ([facility] [address]      History reviewed. No pertinent surgical history. History reviewed. No pertinent family history. Social History     Tobacco Use    Smoking status: Every Day     Types: Cigarettes    Smokeless tobacco: Never    Tobacco comments:     Pack last 2-3 days   Substance Use Topics    Alcohol use: Yes     Comment: occasional      Prior to Admission medications    Medication Sig Start Date End Date Taking?  Authorizing Provider   apixaban (ELIQUIS) 5 MG TABS tablet Take 1 tablet by mouth 2 times daily 4/26/23  Yes Ar Automatic Reconciliation   Cholecalciferol 50 MCG (2000 UT) TABS Take by mouth   Yes Ar Automatic Reconciliation   metFORMIN (GLUCOPHAGE) 500 MG tablet Take 2 tablets by mouth 2 times daily (with meals)   Yes Ar Automatic Reconciliation              No Known Allergies        Objective:     Vitals:    06/01/23 1508   BP:

## 2023-06-05 RX ORDER — APIXABAN 5 MG/1
TABLET, FILM COATED ORAL
Qty: 60 TABLET | Refills: 3 | Status: SHIPPED | OUTPATIENT
Start: 2023-06-05

## 2023-10-05 RX ORDER — APIXABAN 5 MG/1
TABLET, FILM COATED ORAL
Qty: 60 TABLET | Refills: 3 | Status: SHIPPED | OUTPATIENT
Start: 2023-10-05

## 2023-11-21 ENCOUNTER — CLINICAL DOCUMENTATION (OUTPATIENT)
Age: 63
End: 2023-11-21

## 2024-01-03 ENCOUNTER — HOSPITAL ENCOUNTER (OUTPATIENT)
Facility: HOSPITAL | Age: 64
Discharge: HOME | End: 2024-01-06
Attending: INTERNAL MEDICINE
Payer: MEDICARE

## 2024-01-03 DIAGNOSIS — I26.99 PULMONARY EMBOLISM, BILATERAL (HCC): ICD-10-CM

## 2024-01-03 LAB — CREAT BLD-MCNC: 0.8 MG/DL (ref 0.6–1.3)

## 2024-01-03 PROCEDURE — 82565 ASSAY OF CREATININE: CPT

## 2024-01-03 PROCEDURE — 71275 CT ANGIOGRAPHY CHEST: CPT

## 2024-01-03 PROCEDURE — 6360000004 HC RX CONTRAST MEDICATION: Performed by: INTERNAL MEDICINE

## 2024-01-03 RX ADMIN — IOPAMIDOL 100 ML: 755 INJECTION, SOLUTION INTRAVENOUS at 15:10

## 2024-01-22 ENCOUNTER — OFFICE VISIT (OUTPATIENT)
Age: 64
End: 2024-01-22
Payer: MEDICARE

## 2024-01-22 VITALS
HEART RATE: 78 BPM | HEIGHT: 66 IN | OXYGEN SATURATION: 96 % | BODY MASS INDEX: 50.62 KG/M2 | WEIGHT: 315 LBS | TEMPERATURE: 97.7 F | DIASTOLIC BLOOD PRESSURE: 81 MMHG | SYSTOLIC BLOOD PRESSURE: 145 MMHG

## 2024-01-22 DIAGNOSIS — I26.99 PULMONARY EMBOLISM, BILATERAL (HCC): Primary | ICD-10-CM

## 2024-01-22 PROCEDURE — 99213 OFFICE O/P EST LOW 20 MIN: CPT | Performed by: INTERNAL MEDICINE

## 2024-01-22 PROCEDURE — 4004F PT TOBACCO SCREEN RCVD TLK: CPT | Performed by: INTERNAL MEDICINE

## 2024-01-22 PROCEDURE — G8417 CALC BMI ABV UP PARAM F/U: HCPCS | Performed by: INTERNAL MEDICINE

## 2024-01-22 PROCEDURE — G8427 DOCREV CUR MEDS BY ELIG CLIN: HCPCS | Performed by: INTERNAL MEDICINE

## 2024-01-22 PROCEDURE — G8484 FLU IMMUNIZE NO ADMIN: HCPCS | Performed by: INTERNAL MEDICINE

## 2024-01-22 PROCEDURE — 3017F COLORECTAL CA SCREEN DOC REV: CPT | Performed by: INTERNAL MEDICINE

## 2024-01-22 NOTE — PROGRESS NOTES
Mitch Umaña is a 63 y.o. male here for 6 month follow up for B/L PE diagnosed 4/1/23.  He is taking Apixaban.  CTA done 1/4/24.   Pt states he has been well. No concerns brought up.     1. Have you been to the ER, urgent care clinic since your last visit?  Hospitalized since your last visit?  no    2. Have you seen or consulted any other health care providers outside of the [facility] since your last visit?  Include any pap smears or colon screening.  no  
occurred 04/1/2023    Unprovoked  Large b/l PE with RV strain  Morbid obesity is an ongoing risk factor for him.    He is taking Apixaban.  I recommend long term anticoagulation.  Repeat CTA - no clot      Plan:       Continue Apixaban  Return in 2 months      Signed by: Loi Riddle MD                     January 22, 2024

## 2024-02-19 RX ORDER — APIXABAN 5 MG/1
TABLET, FILM COATED ORAL
Qty: 60 TABLET | Refills: 3 | Status: SHIPPED | OUTPATIENT
Start: 2024-02-19

## 2024-06-26 DIAGNOSIS — I26.99 PULMONARY EMBOLISM, BILATERAL (HCC): Primary | ICD-10-CM

## 2024-06-26 RX ORDER — APIXABAN 5 MG/1
TABLET, FILM COATED ORAL
Qty: 60 TABLET | Refills: 1 | Status: SHIPPED | OUTPATIENT
Start: 2024-06-26 | End: 2024-08-22

## 2024-08-22 DIAGNOSIS — I26.99 PULMONARY EMBOLISM, BILATERAL (HCC): ICD-10-CM

## 2024-08-22 RX ORDER — APIXABAN 5 MG/1
TABLET, FILM COATED ORAL
Qty: 60 TABLET | Refills: 5 | Status: SHIPPED | OUTPATIENT
Start: 2024-08-22

## 2025-03-12 DIAGNOSIS — I26.99 PULMONARY EMBOLISM, BILATERAL (HCC): ICD-10-CM

## 2025-03-12 RX ORDER — APIXABAN 5 MG/1
5 TABLET, FILM COATED ORAL 2 TIMES DAILY
Qty: 60 TABLET | Refills: 5 | Status: SHIPPED | OUTPATIENT
Start: 2025-03-12